# Patient Record
Sex: FEMALE | Race: WHITE | NOT HISPANIC OR LATINO | Employment: OTHER | URBAN - METROPOLITAN AREA
[De-identification: names, ages, dates, MRNs, and addresses within clinical notes are randomized per-mention and may not be internally consistent; named-entity substitution may affect disease eponyms.]

---

## 2018-04-04 ENCOUNTER — HOSPITAL ENCOUNTER (OUTPATIENT)
Dept: RADIOLOGY | Facility: HOSPITAL | Age: 63
Discharge: HOME | End: 2018-04-04
Attending: NURSE PRACTITIONER
Payer: MEDICARE

## 2018-04-04 DIAGNOSIS — M54.50 LUMBAGO: ICD-10-CM

## 2018-04-04 DIAGNOSIS — M25.551 RIGHT HIP PAIN: Primary | ICD-10-CM

## 2018-04-04 DIAGNOSIS — M25.551 RIGHT HIP PAIN: ICD-10-CM

## 2018-04-04 PROCEDURE — 73502 X-RAY EXAM HIP UNI 2-3 VIEWS: CPT | Mod: 26,RT,, | Performed by: RADIOLOGY

## 2018-04-04 PROCEDURE — 73502 X-RAY EXAM HIP UNI 2-3 VIEWS: CPT | Mod: TC,FY,RT

## 2018-04-04 PROCEDURE — 72110 X-RAY EXAM L-2 SPINE 4/>VWS: CPT | Mod: 26,,, | Performed by: RADIOLOGY

## 2018-04-04 PROCEDURE — 72110 X-RAY EXAM L-2 SPINE 4/>VWS: CPT | Mod: TC,FY

## 2018-06-04 DIAGNOSIS — Z12.31 ENCOUNTER FOR SCREENING MAMMOGRAM FOR MALIGNANT NEOPLASM OF BREAST: Primary | ICD-10-CM

## 2018-06-19 DIAGNOSIS — Z78.0 POSTMENOPAUSAL STATUS: Primary | ICD-10-CM

## 2018-06-19 DIAGNOSIS — Z13.820 SCREENING FOR OSTEOPOROSIS: ICD-10-CM

## 2018-06-27 ENCOUNTER — HOSPITAL ENCOUNTER (OUTPATIENT)
Dept: RADIOLOGY | Facility: HOSPITAL | Age: 63
Discharge: HOME | End: 2018-06-27
Attending: OBSTETRICS & GYNECOLOGY
Payer: MEDICARE

## 2018-06-27 VITALS — HEIGHT: 63 IN | WEIGHT: 180 LBS | BODY MASS INDEX: 31.89 KG/M2

## 2018-06-27 DIAGNOSIS — Z12.31 ENCOUNTER FOR SCREENING MAMMOGRAM FOR MALIGNANT NEOPLASM OF BREAST: ICD-10-CM

## 2018-06-27 DIAGNOSIS — Z13.820 SCREENING FOR OSTEOPOROSIS: ICD-10-CM

## 2018-06-27 DIAGNOSIS — Z78.0 POSTMENOPAUSAL STATUS: ICD-10-CM

## 2018-06-27 PROCEDURE — 77080 DXA BONE DENSITY AXIAL: CPT | Mod: 26,,, | Performed by: RADIOLOGY

## 2018-06-27 PROCEDURE — 77067 SCR MAMMO BI INCL CAD: CPT | Mod: TC

## 2018-06-27 PROCEDURE — 77080 DXA BONE DENSITY AXIAL: CPT | Mod: TC

## 2018-06-27 PROCEDURE — 77067 SCR MAMMO BI INCL CAD: CPT | Mod: 26,,, | Performed by: RADIOLOGY

## 2019-01-07 DIAGNOSIS — Z12.39 SCREENING BREAST EXAMINATION: Primary | ICD-10-CM

## 2019-01-07 DIAGNOSIS — J20.9 ACUTE BRONCHITIS: ICD-10-CM

## 2019-04-29 ENCOUNTER — HOSPITAL ENCOUNTER (EMERGENCY)
Facility: HOSPITAL | Age: 64
Discharge: HOME | End: 2019-04-29
Attending: EMERGENCY MEDICINE
Payer: MEDICARE

## 2019-04-29 VITALS
HEIGHT: 63 IN | OXYGEN SATURATION: 97 % | BODY MASS INDEX: 31.89 KG/M2 | TEMPERATURE: 98 F | HEART RATE: 74 BPM | DIASTOLIC BLOOD PRESSURE: 84 MMHG | SYSTOLIC BLOOD PRESSURE: 154 MMHG | RESPIRATION RATE: 16 BRPM | WEIGHT: 180 LBS

## 2019-04-29 DIAGNOSIS — R06.02 SOB (SHORTNESS OF BREATH): ICD-10-CM

## 2019-04-29 DIAGNOSIS — R20.0 NUMBNESS: ICD-10-CM

## 2019-04-29 DIAGNOSIS — R20.2 PINS AND NEEDLES SENSATION: Primary | ICD-10-CM

## 2019-04-29 LAB
ALBUMIN SERPL BCP-MCNC: 4.2 G/DL (ref 3.5–5.2)
ALP SERPL-CCNC: 86 U/L (ref 55–135)
ALT SERPL W/O P-5'-P-CCNC: 34 U/L (ref 10–44)
ANION GAP SERPL CALC-SCNC: 10 MMOL/L (ref 8–16)
AST SERPL-CCNC: 36 U/L (ref 10–40)
BASOPHILS # BLD AUTO: 0.09 K/UL (ref 0–0.2)
BASOPHILS NFR BLD: 1.1 % (ref 0–1.9)
BILIRUB SERPL-MCNC: 0.6 MG/DL (ref 0.1–1)
BILIRUB UR QL STRIP: NEGATIVE
BUN SERPL-MCNC: 5 MG/DL (ref 8–23)
CALCIUM SERPL-MCNC: 9.7 MG/DL (ref 8.7–10.5)
CHLORIDE SERPL-SCNC: 105 MMOL/L (ref 95–110)
CLARITY UR: CLEAR
CO2 SERPL-SCNC: 26 MMOL/L (ref 23–29)
COLOR UR: YELLOW
CREAT SERPL-MCNC: 0.8 MG/DL (ref 0.5–1.4)
DIFFERENTIAL METHOD: NORMAL
EOSINOPHIL # BLD AUTO: 0.4 K/UL (ref 0–0.5)
EOSINOPHIL NFR BLD: 4.9 % (ref 0–8)
ERYTHROCYTE [DISTWIDTH] IN BLOOD BY AUTOMATED COUNT: 13 % (ref 11.5–14.5)
EST. GFR  (AFRICAN AMERICAN): >60 ML/MIN/1.73 M^2
EST. GFR  (NON AFRICAN AMERICAN): >60 ML/MIN/1.73 M^2
GLUCOSE SERPL-MCNC: 95 MG/DL (ref 70–110)
GLUCOSE UR QL STRIP: NEGATIVE
HCT VFR BLD AUTO: 42.3 % (ref 37–48.5)
HGB BLD-MCNC: 13.8 G/DL (ref 12–16)
HGB UR QL STRIP: NEGATIVE
IMM GRANULOCYTES # BLD AUTO: 0.02 K/UL (ref 0–0.04)
IMM GRANULOCYTES NFR BLD AUTO: 0.2 % (ref 0–0.5)
KETONES UR QL STRIP: NEGATIVE
LEUKOCYTE ESTERASE UR QL STRIP: NEGATIVE
LYMPHOCYTES # BLD AUTO: 3.1 K/UL (ref 1–4.8)
LYMPHOCYTES NFR BLD: 37.2 % (ref 18–48)
MCH RBC QN AUTO: 29.9 PG (ref 27–31)
MCHC RBC AUTO-ENTMCNC: 32.6 G/DL (ref 32–36)
MCV RBC AUTO: 92 FL (ref 82–98)
MONOCYTES # BLD AUTO: 0.6 K/UL (ref 0.3–1)
MONOCYTES NFR BLD: 7.6 % (ref 4–15)
NEUTROPHILS # BLD AUTO: 4.1 K/UL (ref 1.8–7.7)
NEUTROPHILS NFR BLD: 49 % (ref 38–73)
NITRITE UR QL STRIP: NEGATIVE
NRBC BLD-RTO: 0 /100 WBC
PH UR STRIP: 7 [PH] (ref 5–8)
PLATELET # BLD AUTO: 291 K/UL (ref 150–350)
PMV BLD AUTO: 10 FL (ref 9.2–12.9)
POTASSIUM SERPL-SCNC: 4 MMOL/L (ref 3.5–5.1)
PROT SERPL-MCNC: 7.5 G/DL (ref 6–8.4)
PROT UR QL STRIP: NEGATIVE
RBC # BLD AUTO: 4.61 M/UL (ref 4–5.4)
SODIUM SERPL-SCNC: 141 MMOL/L (ref 136–145)
SP GR UR STRIP: 1.01 (ref 1–1.03)
TROPONIN I SERPL DL<=0.01 NG/ML-MCNC: <0.01 NG/ML (ref 0.02–0.5)
URN SPEC COLLECT METH UR: NORMAL
UROBILINOGEN UR STRIP-ACNC: NEGATIVE EU/DL
WBC # BLD AUTO: 8.31 K/UL (ref 3.9–12.7)

## 2019-04-29 PROCEDURE — 81003 URINALYSIS AUTO W/O SCOPE: CPT

## 2019-04-29 PROCEDURE — 36415 COLL VENOUS BLD VENIPUNCTURE: CPT

## 2019-04-29 PROCEDURE — 71046 XR CHEST PA AND LATERAL: ICD-10-PCS | Mod: 26,,, | Performed by: RADIOLOGY

## 2019-04-29 PROCEDURE — 85025 COMPLETE CBC W/AUTO DIFF WBC: CPT

## 2019-04-29 PROCEDURE — 70450 CT HEAD/BRAIN W/O DYE: CPT | Mod: 26,,, | Performed by: RADIOLOGY

## 2019-04-29 PROCEDURE — 80053 COMPREHEN METABOLIC PANEL: CPT

## 2019-04-29 PROCEDURE — 71046 X-RAY EXAM CHEST 2 VIEWS: CPT | Mod: TC,FY

## 2019-04-29 PROCEDURE — 71046 X-RAY EXAM CHEST 2 VIEWS: CPT | Mod: 26,,, | Performed by: RADIOLOGY

## 2019-04-29 PROCEDURE — 99285 EMERGENCY DEPT VISIT HI MDM: CPT | Mod: 25

## 2019-04-29 PROCEDURE — 84484 ASSAY OF TROPONIN QUANT: CPT

## 2019-04-29 PROCEDURE — 93005 ELECTROCARDIOGRAM TRACING: CPT

## 2019-04-29 PROCEDURE — 70450 CT HEAD/BRAIN W/O DYE: CPT | Mod: TC

## 2019-04-29 PROCEDURE — 70450 CT HEAD WITHOUT CONTRAST: ICD-10-PCS | Mod: 26,,, | Performed by: RADIOLOGY

## 2019-04-29 RX ORDER — LEVOTHYROXINE SODIUM 75 UG/1
TABLET ORAL
COMMUNITY
End: 2020-11-13

## 2019-04-29 RX ORDER — FLUTICASONE PROPIONATE AND SALMETEROL 250; 50 UG/1; UG/1
1 POWDER RESPIRATORY (INHALATION)
COMMUNITY

## 2019-04-29 RX ORDER — DIAZEPAM 5 MG/1
TABLET ORAL
COMMUNITY
End: 2022-08-24 | Stop reason: DRUGHIGH

## 2019-04-29 RX ORDER — DICYCLOMINE HYDROCHLORIDE 20 MG/1
TABLET ORAL
COMMUNITY

## 2019-04-29 RX ORDER — ALBUTEROL SULFATE 90 UG/1
AEROSOL, METERED RESPIRATORY (INHALATION)
COMMUNITY
Start: 2018-04-04

## 2019-04-29 RX ORDER — ESOMEPRAZOLE MAGNESIUM 40 MG/1
CAPSULE, DELAYED RELEASE ORAL
COMMUNITY
End: 2022-08-24 | Stop reason: ALTCHOICE

## 2019-04-29 NOTE — ED PROVIDER NOTES
"Encounter Date: 4/29/2019       History     Chief Complaint   Patient presents with    Numbness     neck problems      Hilda Cheek is a  63 y.o female with PMHx including arthritis, COPD, hypertension and thyroid disease. She presents to ED with complaint of "tingling- sensation to left face and left upper extremity x 3 days.  No "tingling-sensation" to left leg  No facial asymetry, slurred speech or arm drift  Hand  are equal  No neurological deficits noted.  Patient is ambulatory with normal gait  She reports chronic neck pain with no recent injury or trauma            Review of patient's allergies indicates:  No Known Allergies  Past Medical History:   Diagnosis Date    Arthritis     COPD (chronic obstructive pulmonary disease)     Hypertension     Thyroid disease      Past Surgical History:   Procedure Laterality Date    OOPHORECTOMY Left     age 21     Family History   Problem Relation Age of Onset    Breast cancer Neg Hx      Social History     Tobacco Use    Smoking status: Not on file   Substance Use Topics    Alcohol use: Not on file    Drug use: Not on file     Review of Systems   Constitutional: Negative.  Negative for fever.   HENT: Negative.  Negative for sore throat.    Eyes: Negative.    Respiratory: Negative.  Negative for shortness of breath.    Cardiovascular: Negative.  Negative for chest pain.   Gastrointestinal: Negative.  Negative for nausea.   Endocrine: Negative.    Genitourinary: Negative.  Negative for dysuria.   Musculoskeletal: Negative.  Negative for back pain.   Skin: Negative for rash.   Allergic/Immunologic: Negative.    Neurological: Positive for numbness ("tingling-sensation"). Negative for dizziness, tremors, seizures, syncope, facial asymmetry, speech difficulty, weakness, light-headedness and headaches.   Hematological: Does not bruise/bleed easily.   Psychiatric/Behavioral: Negative.    All other systems reviewed and are negative.      Physical Exam     Initial " "Vitals [04/29/19 1443]   BP Pulse Resp Temp SpO2   138/84 70 18 98.6 °F (37 °C) 97 %      MAP       --         Physical Exam    Nursing note and vitals reviewed.  Constitutional: She appears well-developed and well-nourished.   HENT:   Head: Normocephalic.   Eyes: Conjunctivae are normal. Pupils are equal, round, and reactive to light.   Neck: Normal range of motion.   Cardiovascular: Normal rate, regular rhythm and normal heart sounds.   Pulmonary/Chest: Breath sounds normal.   Musculoskeletal: Normal range of motion.        Cervical back: She exhibits tenderness, pain and spasm. She exhibits normal range of motion, no bony tenderness, no swelling, no edema, no deformity, no laceration and normal pulse.   Neurological: She is alert and oriented to person, place, and time. She has normal strength. No sensory deficit. Coordination and gait normal. GCS eye subscore is 4. GCS verbal subscore is 5. GCS motor subscore is 6.   Patient can feel touch to face and arm.   She states "I can feel"    She complaints of pins and needles sensation to left cheek, arm and hand   Skin: Skin is warm and dry.   Psychiatric: She has a normal mood and affect. Her behavior is normal. Judgment and thought content normal.         ED Course   Procedures  Labs Reviewed   COMPREHENSIVE METABOLIC PANEL - Abnormal; Notable for the following components:       Result Value    BUN, Bld 5 (*)     All other components within normal limits   TROPONIN I - Abnormal; Notable for the following components:    Troponin I <0.01 (*)     All other components within normal limits   CBC W/ AUTO DIFFERENTIAL   URINALYSIS, REFLEX TO URINE CULTURE    Narrative:     Preferred Collection Type->Urine, Clean Catch          Imaging Results          X-Ray Chest PA And Lateral (Final result)  Result time 04/29/19 16:01:18    Final result by Isaias Guzmán MD (04/29/19 16:01:18)                 Impression:      No acute abnormality.      Electronically signed " by: Isaias Guzmán  Date:    04/29/2019  Time:    16:01             Narrative:    EXAMINATION:  XR CHEST PA AND LATERAL    CLINICAL HISTORY:  Shortness of breath    TECHNIQUE:  PA and lateral views of the chest were performed.    COMPARISON:  Chest x-ray 07/08/2011.    FINDINGS:  The lungs are clear, with normal appearance of pulmonary vasculature and no pleural effusion or pneumothorax.    The cardiac silhouette is normal in size. The hilar and mediastinal contours are unremarkable.    Bones are intact.                               CT Head Without Contrast (Final result)  Result time 04/29/19 15:52:49    Final result by Isaias Guzmán MD (04/29/19 15:52:49)                 Impression:      1. Mild cortical atrophy with periventricular deep white matter change consistent with early small vessel ischemic disease.  2. Dependent mucous retention cyst of the left maxillary sinus.      Electronically signed by: Isaias Guzmán  Date:    04/29/2019  Time:    15:52             Narrative:    EXAMINATION:  CT HEAD WITHOUT CONTRAST    CLINICAL HISTORY:  Focal neuro deficit, new, fixed or worsening, >6 hours;    TECHNIQUE:  Low dose axial images were obtained through the head.  Coronal and sagittal reformations were also performed. Contrast was not administered.    COMPARISON:  Head CT 10/12/2016.    FINDINGS:  There is no acute hemorrhage or infarction.  There is mild cortical atrophy.  There are periventricular deep white matter changes consistent with early small vessel ischemic disease.    No extra-axial fluid collections.  Ventricles are normal in size, shape and configuration.  The basal cisterns are patent.    Partial visualization of a dependent mucous retention cyst of the left maxillary sinus.  The remaining imaged paranasal sinuses and ethmoid air cells are well aerated.    The mastoid air cells and middle ears are normally pneumatized.                                 Medical Decision Making:   Initial  "Assessment:   Patient with complaint of "tingling- sensation to left face and left upper extremity x 3 days.  No "tingling-sensation" to left leg  No facial asymetry, slurred speech or arm drift  Hand  are equal  No neurological deficits noted.  Patient is ambulatory with normal gait        Differential Diagnosis:   Stroke, intracranial etiology  parathesia  Clinical Tests:   Lab Tests: Ordered  Radiological Study: Ordered  Medical Tests: Ordered  ED Management:  Labs, CT head and CXR reviewed by Dr. Rose. He recommended that she follow-up with PCP and add baby aspirin daily to regimen    Discussed physical exam findings with patient  No acute emergent medical condition identified at this time to warrant further testing/diagnostics.  Plan to discharge patient home with instructions per AVS.  Follow-up with PCP in 2-5 days or return to ED if symptoms worsen.  Patient verbalized agreement to discharge treatment plan.        Other:   I discussed test(s) with the performing physician.                   ED Course as of Apr 29 1802   Mon Apr 29, 2019   1759 Dr. Rose reviewed labs, CT head and chest XR    [JL]      ED Course User Index  [JL] Renata Flannery NP     Clinical Impression:       ICD-10-CM ICD-9-CM   1. Pins and needles sensation R20.2 782.0   2. Numbness R20.0 782.0   3. SOB (shortness of breath) R06.02 786.05                                Renata Flannery NP  04/29/19 2134    "

## 2019-05-02 DIAGNOSIS — Z12.39 SCREENING BREAST EXAMINATION: Primary | ICD-10-CM

## 2019-07-01 ENCOUNTER — HOSPITAL ENCOUNTER (OUTPATIENT)
Dept: RADIOLOGY | Facility: HOSPITAL | Age: 64
Discharge: HOME | End: 2019-07-01
Attending: NURSE PRACTITIONER
Payer: MEDICARE

## 2019-07-01 VITALS — WEIGHT: 180 LBS | BODY MASS INDEX: 31.89 KG/M2 | HEIGHT: 63 IN

## 2019-07-01 DIAGNOSIS — Z12.39 SCREENING BREAST EXAMINATION: ICD-10-CM

## 2019-07-01 PROCEDURE — 77063 MAMMO DIGITAL SCREENING BILAT WITH TOMOSYNTHESIS_CAD: ICD-10-PCS | Mod: 26,,, | Performed by: RADIOLOGY

## 2019-07-01 PROCEDURE — 77067 SCR MAMMO BI INCL CAD: CPT | Mod: 26,,, | Performed by: RADIOLOGY

## 2019-07-01 PROCEDURE — 77063 BREAST TOMOSYNTHESIS BI: CPT | Mod: 26,,, | Performed by: RADIOLOGY

## 2019-07-01 PROCEDURE — 77067 SCR MAMMO BI INCL CAD: CPT | Mod: TC

## 2019-07-01 PROCEDURE — 77067 MAMMO DIGITAL SCREENING BILAT WITH TOMOSYNTHESIS_CAD: ICD-10-PCS | Mod: 26,,, | Performed by: RADIOLOGY

## 2019-08-19 DIAGNOSIS — Z12.39 SCREENING BREAST EXAMINATION: Primary | ICD-10-CM

## 2019-08-19 DIAGNOSIS — Z13.820 SCREENING FOR OSTEOPOROSIS: ICD-10-CM

## 2019-08-19 DIAGNOSIS — Z78.0 POSTMENOPAUSAL STATUS: ICD-10-CM

## 2020-01-03 ENCOUNTER — CLINICAL SUPPORT (OUTPATIENT)
Dept: REHABILITATION | Facility: HOSPITAL | Age: 65
End: 2020-01-03
Payer: MEDICARE

## 2020-01-03 DIAGNOSIS — M62.81 MUSCLE WEAKNESS: ICD-10-CM

## 2020-01-03 DIAGNOSIS — M54.10 RADICULOPATHY AFFECTING UPPER EXTREMITY: ICD-10-CM

## 2020-01-03 PROCEDURE — 97162 PT EVAL MOD COMPLEX 30 MIN: CPT
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
## 2020-01-03 NOTE — PROGRESS NOTES
See Initial Evaluation in the POC section            PT met face to face with Kartik Bills PTA to discuss patient's treatment plan and progress towards established goals.  Treatment will be continued as described in initial report/eval and progress notes.  Patient will be seen by physical therapist every sixth visit and minimally once per month.    Additional information: N/A

## 2020-01-07 PROBLEM — M54.10 RADICULOPATHY AFFECTING UPPER EXTREMITY: Status: ACTIVE | Noted: 2020-01-07

## 2020-01-07 PROBLEM — M62.81 MUSCLE WEAKNESS: Status: ACTIVE | Noted: 2020-01-07

## 2020-01-22 ENCOUNTER — CLINICAL SUPPORT (OUTPATIENT)
Dept: REHABILITATION | Facility: HOSPITAL | Age: 65
End: 2020-01-22
Payer: MEDICARE

## 2020-01-22 DIAGNOSIS — M54.10 RADICULOPATHY AFFECTING UPPER EXTREMITY: ICD-10-CM

## 2020-01-22 DIAGNOSIS — M62.81 MUSCLE WEAKNESS: ICD-10-CM

## 2020-01-22 PROCEDURE — 97110 THERAPEUTIC EXERCISES: CPT

## 2020-01-22 PROCEDURE — 97140 MANUAL THERAPY 1/> REGIONS: CPT

## 2020-01-22 NOTE — PROGRESS NOTES
Physical Therapy Daily Treatment Note   Name: Hilda Cheek 1955  MRN: [id number]    Visit Date: 1/22/2020  Visit #: 2 / 12  Authorization period Expiration: 12/31/20    Plan of Care Expiration: 3/27/20  Precautions: standard    Time In: 10:09  Time Out: 11:00  Total 1:1 Treatment Time: 32 min    Treatment Diagnosis:   Encounter Diagnoses   Name Primary?    Radiculopathy affecting upper extremity     Muscle weakness      Physician: Maria Elena Whittington DO    Subjective   Pt reports: no change since last seen, no pain reported just N/T in LUE described as aggravating but not as bad as it was     Pain Scale:  0/10 on VAS currently,  Pain Location: left UE    Objective     HILDA received moist heat to cervical spine prior to manual therapy for 10 min after being cleared for contradictions with no adverse effects noted.    Hilda received therapeutic exercises to develop strength, ROM, flexibility and posture for 10 minutes including:    Shoulder blade squeezes with 5 sec hold x 10  Chin tucks with 5 sec hold x 10  Corner stretch with 5 sec hold x 10    HILDA received the following manual therapy techniques: Joint mobilizations AP, rot, and transverse glides, Manual traction, PROM, and Soft tissue Mobilization were applied to the: Csp and Tsp for 22 minutes.     Home Exercises and Education Provided     Education provided re:   - progress towards goals   - role of therapy in multi - disciplinary team, goals for therapy  Pt educated on condition, POC, and expectations in therapy.  No spiritual or educational barriers to learning provided    Home exercises: shld blade squeezes w 5 sec hold x 10, chin tucks w 5 sec hold x 10, corner stretch  Pt will be provided HEP during course of treatment with progressions as appropriate. Pt was advised to perform these exercises free of pain, and to stop performing them if pain occurs.   HILDA demonstrated good  understanding of the education  provided.     Assessment   MILDRED tolerated treatment without complication or increase pain or radicular symptoms reported.     Pt prognosis is Fair. Pt will continue to benefit from skilled outpatient physical therapy to address the deficits listed in the problem list chart on initial evaluation, provide pt/family education and to maximize pt's level of independence in the home and community environment.     Medical necessity is demonstrated by the impairments and functional limitations listed on the Initial Evaluation.     Anticipated barriers to physical therapy: chronicity of symptoms  Pt's spiritual, cultural and educational needs considered and pt agreeable to plan of care and goals.    Plan   Continue with established Plan of Care towards Physical Therapy goals.   Discussed Plan of Care with patient: Yes    Dave Navarro, PT  1/22/2020

## 2020-01-24 ENCOUNTER — CLINICAL SUPPORT (OUTPATIENT)
Dept: REHABILITATION | Facility: HOSPITAL | Age: 65
End: 2020-01-24
Payer: MEDICARE

## 2020-01-24 DIAGNOSIS — M54.10 RADICULOPATHY AFFECTING UPPER EXTREMITY: ICD-10-CM

## 2020-01-24 DIAGNOSIS — M62.81 MUSCLE WEAKNESS: ICD-10-CM

## 2020-01-24 PROCEDURE — 97110 THERAPEUTIC EXERCISES: CPT

## 2020-01-24 PROCEDURE — 97140 MANUAL THERAPY 1/> REGIONS: CPT

## 2020-01-24 NOTE — PROGRESS NOTES
Physical Therapy Daily Treatment Note   Name: Hilda Cheek 1955  MRN: [id number]    Visit Date: 1/24/2020  Visit #: 3 / 12  Authorization period Expiration: 12/31/20    Plan of Care Expiration: 3/27/20  Precautions: standard    Time In: 9:12 am  Time Out: 10:00  Total 1:1 Treatment Time: 30 min    Treatment Diagnosis:   Encounter Diagnoses   Name Primary?    Radiculopathy affecting upper extremity     Muscle weakness      Physician: Maria Elena Whittington DO    Subjective   Pt reports: no c/o neck pain and no change in LUE N/T     Pain Scale:  0/10 on VAS currently,  Pain Location: neck, left UE    Objective     HILDA received moist heat to cervical spine prior to manual therapy for 10 min after being cleared for contradictions with no adverse effects noted.    Hilda received therapeutic exercises to develop strength, ROM, flexibility and posture for 10 minutes including:    Shoulder blade squeezes with 5 sec hold x 10  Chin tucks with 5 sec hold x 10  Corner stretch with 5 sec hold x 10  B ER with YTB with 3 sec hold x 10    HILDA received the following manual therapy techniques: Joint mobilizations AP, rot, and transverse glides, Manual traction, PROM, and Soft tissue Mobilization were applied to the: Csp and Tsp for 20 minutes.     Home Exercises and Education Provided     Education provided re: postural care/correction and body mechanics  - progress towards goals   - role of therapy in multi - disciplinary team, goals for therapy  Pt educated on condition, POC, and expectations in therapy.  No spiritual or educational barriers to learning provided    Home exercises:  Reviewed current HEP consisting of shld blade squeezes w 5 sec hold x 10, chin tucks w 5 sec hold x 10, corner stretch, and added B ER with YTB for posture correct  Pt will be provided HEP during course of treatment with progressions as appropriate. Pt was advised to perform these exercises free of pain, and  to stop performing them if pain occurs.   MILDRED demonstrated good  understanding of the education provided.     Assessment   MILDRED tolerated treatment without complication or increase pain or radicular symptoms reported. She does report increased ttp with AP mob to T6-T8 spinous processes which she does not describe as pain just tender. Bilateral N/T in hands likely due to CTS as there is no change in radicular symptoms with treatment. Will progress with strengthening exercises to neck and upper trunk as tolerated.    Pt prognosis is Fair. Pt will continue to benefit from skilled outpatient physical therapy to address the deficits listed in the problem list chart on initial evaluation, provide pt/family education and to maximize pt's level of independence in the home and community environment.     Medical necessity is demonstrated by the impairments and functional limitations listed on the Initial Evaluation.     Anticipated barriers to physical therapy: chronicity of symptoms  Pt's spiritual, cultural and educational needs considered and pt agreeable to plan of care and goals.    Plan   Continue with established Plan of Care towards Physical Therapy goals.   Discussed Plan of Care with patient: Yes    Dave Navarro, PT  1/24/2020

## 2020-01-31 ENCOUNTER — CLINICAL SUPPORT (OUTPATIENT)
Dept: REHABILITATION | Facility: HOSPITAL | Age: 65
End: 2020-01-31
Payer: MEDICARE

## 2020-01-31 DIAGNOSIS — M54.10 RADICULOPATHY AFFECTING UPPER EXTREMITY: ICD-10-CM

## 2020-01-31 DIAGNOSIS — M62.81 MUSCLE WEAKNESS: ICD-10-CM

## 2020-01-31 PROCEDURE — 97140 MANUAL THERAPY 1/> REGIONS: CPT

## 2020-01-31 PROCEDURE — 97110 THERAPEUTIC EXERCISES: CPT

## 2020-01-31 NOTE — PROGRESS NOTES
"                            Physical Therapy Daily Treatment Note   Name: Hilda Cheek 1955  MRN: [id number]    Visit Date: 2020  Visit #:   Authorization period Expiration: 20    Plan of Care Expiration: 3/27/20  Precautions: standard    Time In: 10:00 am  Time Out: 11:00 am  Total 1:1 Treatment Time: 38 min    Treatment Diagnosis:   Encounter Diagnoses   Name Primary?    Radiculopathy affecting upper extremity     Muscle weakness      Physician: Maria Elena Whittington DO    Subjective   Pt reports: No c/o neck pain and no change in LUE N/T, her hand feels like she is holding sand. She reports going to see ortho yesterday and getting an injection in her right lateral hip/thigh area because she has been having intermittent numbness there. She is also having intermittent "pain" in her left leg and continues to have numbness around her mouth and nose on left side. She  does not feel like she is suffering or she is limited, her symptoms just annoy her. Hilda stating these symptoms started when her brother  last year.     Pain Scale:  0/10 on VAS currently,  Pain Location: neck, left UE    Objective     HILDA received moist heat to cervical spine prior to manual therapy for 10 min after being cleared for contradictions with no adverse effects noted.    Hilda received therapeutic exercises to develop strength, ROM, flexibility and posture for 23 minutes including:    Shoulder blade squeezes with 5 sec hold x 10  Chin tucks with 5 sec hold x 10  Corner stretch with 5 sec hold x 10  B ER with YTB with 3 sec hold x 10  Seated knee ext with 5 sec hold x 10 ea  B rows with YTB x 10  B shoulder ext with YTB x 10  B H abd at 90 degrees with YTB x 10  Nustep Lv0 x 10 min    HILDA received the following manual therapy techniques: Joint mobilizations AP, rot, and transverse glides, Manual traction, PROM, and Soft tissue Mobilization were applied to the: Csp and Tsp for 15 minutes.     Special " tests:  UE neurodynamic testing neg  Cervical compression/distraction neg  Facet loading neg on left, mild pain reported on right  Spurlings neg on left, mild pain reported on right    Home Exercises and Education Provided     Education provided re: postural care/correction and body mechanics  - progress towards goals   - role of therapy in multi - disciplinary team, goals for therapy  Pt educated on condition, POC, and expectations in therapy.  No spiritual or educational barriers to learning provided    Home exercises:  Reviewed current HEP consisting of shld blade squeezes w 5 sec hold x 10, chin tucks w 5 sec hold x 10, corner stretch, B ER with YTB for posture correct  Pt will be provided HEP during course of treatment with progressions as appropriate. Pt was advised to perform these exercises free of pain, and to stop performing them if pain occurs.   MILDRED demonstrated good  understanding of the education provided.     Assessment   MILDRED tolerated treatment well progressing with exercise as noted above without complication or increase pain or radicular symptoms reported. Neurodynamic UE testing continues to be negative for symptom reproduction. Will progress with strengthening exercises to neck and upper trunk as tolerated.    Pt prognosis is Fair. Pt will continue to benefit from skilled outpatient physical therapy to address the deficits listed in the problem list chart on initial evaluation, provide pt/family education and to maximize pt's level of independence in the home and community environment.     Medical necessity is demonstrated by the impairments and functional limitations listed on the Initial Evaluation.     Anticipated barriers to physical therapy: chronicity of symptoms  Pt's spiritual, cultural and educational needs considered and pt agreeable to plan of care and goals.    Plan   Continue with established Plan of Care towards Physical Therapy goals.   Discussed Plan of Care with  patient: Yes    Dave Navarro, PT  1/31/2020

## 2020-02-07 ENCOUNTER — CLINICAL SUPPORT (OUTPATIENT)
Dept: REHABILITATION | Facility: HOSPITAL | Age: 65
End: 2020-02-07
Payer: MEDICARE

## 2020-02-07 DIAGNOSIS — M54.10 RADICULOPATHY AFFECTING UPPER EXTREMITY: ICD-10-CM

## 2020-02-07 DIAGNOSIS — M62.81 MUSCLE WEAKNESS: ICD-10-CM

## 2020-02-07 PROCEDURE — 97110 THERAPEUTIC EXERCISES: CPT

## 2020-02-07 PROCEDURE — 97140 MANUAL THERAPY 1/> REGIONS: CPT

## 2020-03-02 ENCOUNTER — CLINICAL SUPPORT (OUTPATIENT)
Dept: REHABILITATION | Facility: HOSPITAL | Age: 65
End: 2020-03-02
Payer: MEDICARE

## 2020-03-02 DIAGNOSIS — M62.81 MUSCLE WEAKNESS: ICD-10-CM

## 2020-03-02 DIAGNOSIS — M54.10 RADICULOPATHY AFFECTING UPPER EXTREMITY: ICD-10-CM

## 2020-03-02 PROCEDURE — 97750 PHYSICAL PERFORMANCE TEST: CPT

## 2020-03-02 PROCEDURE — 97110 THERAPEUTIC EXERCISES: CPT

## 2020-03-02 NOTE — PROGRESS NOTES
Physical Therapy Daily Treatment Note / Discharge Note   Name: Hilda Cheek 1955  MRN: [id number]    Visit Date: 3/2/2020  Visit #: 6   Authorization period Expiration: 12/31/20    Plan of Care Expiration: 3/27/20  Precautions: standard    Time In: 14:05 pm  Time Out: 14:55 pm  Total 1:1 Treatment Time: 38 min    Treatment Diagnosis:   Encounter Diagnoses   Name Primary?    Radiculopathy affecting upper extremity     Muscle weakness      Physician: Maria Elena Whittington DO    Subjective   Pt reports: No c/o neck pain and no change in LUE N/T, overall feels stronger and feels ready for discharge    Pain Scale:  0/10 on VAS currently  Pain Location: left UE    Objective     Hilda received therapeutic exercises to develop strength, ROM, flexibility and posture for 25 minutes including:    UBE with 2 bars 6 min  B rows with RTB x 10  B shoulder ext with RTB x 10  B H abd at 90 degrees with RTB x 10  Nustep Lv1 x 10 min  B ER with RTB with 3 sec hold x 10   Seated knee ext with 5 sec hold x 10 ea DNP  Reviewed HEP    HILDA received the following manual therapy techniques: Joint mobilizations AP, rot, and transverse glides, Manual traction, PROM, and Soft tissue Mobilization were applied to the: Csp and Tsp for 15 minutes. DNP  Moist heat not performed today.    Physical performance testing x 10 minutes with following written report. Discussed findings with patient to educate on gains made since IE and residual deficits remaining.    Cervical Range of Motion:   Degrees  IE                3/2/20 Pain   Flexion 50                    60 No increase      Extension 30                    50 End range       Right Rotation 60                    62 No increase      Left Rotation 58                    70 No increase      Right Side Bending 45                    50 End range   Left Side Bending 45                    45 End range     Upper Extremity Strength  (R) UE  IE                    3/2/20  (L) UE  IE                     3/2/20   Shoulder flexion: 5/5                       5/5 Shoulder flexion: 4+/5                      5/5   Shoulder Abduction: 5/5                       5/5 Shoulder abduction: 4+/5                      5/5   Shoulder ER 4+/5                     5/5 Shoulder ER 4+/5                      5/5   Shoulder IR 4+/5                     5/5 Shoulder IR 4+/5                      5/5   Elbow flexion: 5/5                       5/5 Elbow flexion: 5/5                        5/5   Elbow extension: 5/5                       5/5 Elbow extension: 5/5                        5/5   Wrist flexion: 4+/5                    5/5 Wrist flexion: 4+/5                     4+/5   Wrist extension: 4+/5                    5/5 Wrist extension: 4/5                       4+/5    23#                   32.5# : 15#                    18.5#   Lower Trap 4/5                      4+/5 Lower Trap 4/5                       4+/5   Middle Trap 4/5                      4+/5 Middle Trap 4/5                       4+/5   Rhomboids 4-/5                      4/5 Rhomboids 4-/5                       4/5     Home Exercises and Education Provided     Education provided re: Discharge instructions  - postural care/correction and body mechanics  - progress towards goals   - role of therapy in multi - disciplinary team, goals for therapy  Pt educated on condition, POC, and expectations in therapy.  No spiritual or educational barriers to learning provided    Home exercises:  Reviewed current HEP for D/C and issued RTB for exercise progression as well as written instructions  Pt will be provided HEP during course of treatment with progressions as appropriate. Pt was advised to perform these exercises free of pain, and to stop performing them if pain occurs.   MILDRED demonstrated good  understanding of the education provided.     Assessment   MILDRED tolerated treatment well without complication, increase pain, or change in radicular  symptoms reported. Patient has no c/o neck pain and has made progress with strength and ROM as noted above, however, continues to have LUE paresthesias not responsive to treatment interventions. Patient has met 6 out of 8 goals set at initial eval and is agreeable to discharge at this time with the understanding that through compliance with HEP she may maintain gains made in therapy.    Long Term Goals: 6 weeks  Pain: Decrease pain to 2/10 at max to allow for improved functional mobility Goal met 3/2/20  Strength: Improve strength in UE and core muscles by at least 1/2 muscle grade for improved cervical stability Goal met 3/2/20  ROM: Improve ROM to 90% of normal limits Goal met 3/2/20  Functional scale: Improve score on Neck Disability Index to 0% impairment Goal not met 3/2/20   Sensation: Decrease reported LUE parasthesias by 25% Goal not met 3/2/20  Postures: Patient to demonstrate improved postural awareness in sitting and standing with min cueing required Goal met 3/2/20  Postures: Improve static standing posture to 30 min without increase pain to increase tolerance for standing activities such as doing dishes Goal met 3/2/20  Exercise: demonstrate independence with home exercise program to maintain gains made in therapy. Goal met 3/2/20    Plan   Discharge onto [facility], follow up with referring physician as scheduled/needed.   Discharge discussed with and agreed upon with patient: Yes    Dave Navarro, PT  3/2/2020

## 2020-08-12 DIAGNOSIS — Z12.31 SCREENING MAMMOGRAM, ENCOUNTER FOR: Primary | ICD-10-CM

## 2020-10-13 ENCOUNTER — HOSPITAL ENCOUNTER (OUTPATIENT)
Dept: RADIOLOGY | Facility: HOSPITAL | Age: 65
Discharge: HOME | End: 2020-10-13
Attending: NURSE PRACTITIONER
Payer: MEDICARE

## 2020-10-13 VITALS — BODY MASS INDEX: 31.87 KG/M2 | HEIGHT: 63 IN | WEIGHT: 179.88 LBS

## 2020-10-13 DIAGNOSIS — Z12.31 SCREENING MAMMOGRAM, ENCOUNTER FOR: ICD-10-CM

## 2020-10-13 PROCEDURE — 77067 SCR MAMMO BI INCL CAD: CPT | Mod: 26,,, | Performed by: RADIOLOGY

## 2020-10-13 PROCEDURE — 77067 SCR MAMMO BI INCL CAD: CPT | Mod: TC

## 2020-10-13 PROCEDURE — 77063 BREAST TOMOSYNTHESIS BI: CPT | Mod: 26,,, | Performed by: RADIOLOGY

## 2020-10-13 PROCEDURE — 77067 MAMMO DIGITAL SCREENING BILAT WITH TOMOSYNTHESIS_CAD: ICD-10-PCS | Mod: 26,,, | Performed by: RADIOLOGY

## 2020-10-13 PROCEDURE — 77063 MAMMO DIGITAL SCREENING BILAT WITH TOMOSYNTHESIS_CAD: ICD-10-PCS | Mod: 26,,, | Performed by: RADIOLOGY

## 2020-11-12 DIAGNOSIS — Z78.0 MENOPAUSE: Primary | ICD-10-CM

## 2020-11-18 ENCOUNTER — HOSPITAL ENCOUNTER (OUTPATIENT)
Dept: RADIOLOGY | Facility: HOSPITAL | Age: 65
Discharge: HOME | End: 2020-11-18
Attending: NURSE PRACTITIONER
Payer: MEDICARE

## 2020-11-18 DIAGNOSIS — Z78.0 MENOPAUSE: ICD-10-CM

## 2020-11-18 PROCEDURE — 77080 DEXA BONE DENSITY SPINE HIP: ICD-10-PCS | Mod: 26,,, | Performed by: RADIOLOGY

## 2020-11-18 PROCEDURE — 77080 DXA BONE DENSITY AXIAL: CPT | Mod: TC

## 2020-11-18 PROCEDURE — 77080 DXA BONE DENSITY AXIAL: CPT | Mod: 26,,, | Performed by: RADIOLOGY

## 2021-08-22 ENCOUNTER — LAB VISIT (OUTPATIENT)
Dept: FAMILY MEDICINE | Facility: CLINIC | Age: 66
End: 2021-08-22
Payer: MEDICARE

## 2021-08-22 ENCOUNTER — TELEPHONE (OUTPATIENT)
Dept: GASTROENTEROLOGY | Facility: CLINIC | Age: 66
End: 2021-08-22

## 2021-08-22 DIAGNOSIS — M79.10 MUSCLE PAIN: ICD-10-CM

## 2021-08-22 DIAGNOSIS — R05.9 COUGH: ICD-10-CM

## 2021-08-22 PROCEDURE — U0005 INFEC AGEN DETEC AMPLI PROBE: HCPCS | Performed by: NURSE PRACTITIONER

## 2021-08-22 PROCEDURE — U0003 INFECTIOUS AGENT DETECTION BY NUCLEIC ACID (DNA OR RNA); SEVERE ACUTE RESPIRATORY SYNDROME CORONAVIRUS 2 (SARS-COV-2) (CORONAVIRUS DISEASE [COVID-19]), AMPLIFIED PROBE TECHNIQUE, MAKING USE OF HIGH THROUGHPUT TECHNOLOGIES AS DESCRIBED BY CMS-2020-01-R: HCPCS | Performed by: NURSE PRACTITIONER

## 2021-08-23 LAB
SARS-COV-2 RNA RESP QL NAA+PROBE: NOT DETECTED
SARS-COV-2- CYCLE NUMBER: NORMAL

## 2021-09-01 ENCOUNTER — HOSPITAL ENCOUNTER (OUTPATIENT)
Dept: RADIOLOGY | Facility: HOSPITAL | Age: 66
Discharge: HOME | End: 2021-09-01
Attending: NURSE PRACTITIONER
Payer: MEDICARE

## 2021-09-01 DIAGNOSIS — M25.561 PAIN IN RIGHT KNEE: ICD-10-CM

## 2021-09-01 PROCEDURE — 73562 X-RAY EXAM OF KNEE 3: CPT | Mod: TC,FY,RT

## 2021-09-01 PROCEDURE — 73562 X-RAY EXAM OF KNEE 3: CPT | Mod: 26,RT,, | Performed by: RADIOLOGY

## 2021-09-01 PROCEDURE — 73562 XR KNEE 3 VIEW RIGHT: ICD-10-PCS | Mod: 26,RT,, | Performed by: RADIOLOGY

## 2021-09-23 DIAGNOSIS — M25.561 RIGHT KNEE PAIN, UNSPECIFIED CHRONICITY: Primary | ICD-10-CM

## 2021-10-02 ENCOUNTER — HOSPITAL ENCOUNTER (EMERGENCY)
Facility: HOSPITAL | Age: 66
Discharge: HOME | End: 2021-10-02
Payer: MEDICARE

## 2021-10-02 VITALS
RESPIRATION RATE: 20 BRPM | HEART RATE: 64 BPM | HEIGHT: 63 IN | BODY MASS INDEX: 31.89 KG/M2 | OXYGEN SATURATION: 97 % | WEIGHT: 180 LBS | DIASTOLIC BLOOD PRESSURE: 72 MMHG | TEMPERATURE: 99 F | SYSTOLIC BLOOD PRESSURE: 140 MMHG

## 2021-10-02 DIAGNOSIS — S61.212A LACERATION OF RIGHT MIDDLE FINGER WITHOUT FOREIGN BODY WITHOUT DAMAGE TO NAIL, INITIAL ENCOUNTER: Primary | ICD-10-CM

## 2021-10-02 PROCEDURE — 99282 EMERGENCY DEPT VISIT SF MDM: CPT

## 2021-10-12 ENCOUNTER — OFFICE VISIT (OUTPATIENT)
Dept: ORTHOPEDICS | Facility: CLINIC | Age: 66
End: 2021-10-12
Payer: MEDICARE

## 2021-10-12 ENCOUNTER — HOSPITAL ENCOUNTER (OUTPATIENT)
Dept: RADIOLOGY | Facility: HOSPITAL | Age: 66
Discharge: HOME | End: 2021-10-12
Attending: ORTHOPAEDIC SURGERY
Payer: MEDICARE

## 2021-10-12 ENCOUNTER — HOSPITAL ENCOUNTER (EMERGENCY)
Facility: HOSPITAL | Age: 66
Discharge: HOME | End: 2021-10-12
Attending: EMERGENCY MEDICINE
Payer: MEDICARE

## 2021-10-12 VITALS
SYSTOLIC BLOOD PRESSURE: 151 MMHG | DIASTOLIC BLOOD PRESSURE: 129 MMHG | OXYGEN SATURATION: 96 % | WEIGHT: 185 LBS | HEIGHT: 63 IN | BODY MASS INDEX: 32.78 KG/M2 | HEART RATE: 107 BPM | RESPIRATION RATE: 18 BRPM | TEMPERATURE: 98 F

## 2021-10-12 VITALS — HEIGHT: 63 IN | BODY MASS INDEX: 31.89 KG/M2 | WEIGHT: 180 LBS | RESPIRATION RATE: 17 BRPM

## 2021-10-12 DIAGNOSIS — B85.2 LICE: Primary | ICD-10-CM

## 2021-10-12 DIAGNOSIS — M25.561 RIGHT KNEE PAIN, UNSPECIFIED CHRONICITY: Primary | ICD-10-CM

## 2021-10-12 DIAGNOSIS — G89.29 CHRONIC PAIN OF RIGHT KNEE: Primary | ICD-10-CM

## 2021-10-12 DIAGNOSIS — M25.561 RIGHT KNEE PAIN, UNSPECIFIED CHRONICITY: ICD-10-CM

## 2021-10-12 DIAGNOSIS — M25.561 CHRONIC PAIN OF RIGHT KNEE: Primary | ICD-10-CM

## 2021-10-12 PROCEDURE — 99999 PR PBB SHADOW E&M-EST. PATIENT-LVL III: ICD-10-PCS | Mod: PBBFAC,,, | Performed by: ORTHOPAEDIC SURGERY

## 2021-10-12 PROCEDURE — 73562 XR KNEE ORTHO RIGHT WITH FLEXION: ICD-10-PCS | Mod: 26,LT,, | Performed by: RADIOLOGY

## 2021-10-12 PROCEDURE — 73564 X-RAY EXAM KNEE 4 OR MORE: CPT | Mod: TC,FY,RT

## 2021-10-12 PROCEDURE — 73562 X-RAY EXAM OF KNEE 3: CPT | Mod: 26,LT,, | Performed by: RADIOLOGY

## 2021-10-12 PROCEDURE — 99999 PR PBB SHADOW E&M-EST. PATIENT-LVL III: CPT | Mod: PBBFAC,,, | Performed by: ORTHOPAEDIC SURGERY

## 2021-10-12 PROCEDURE — 99203 PR OFFICE/OUTPT VISIT, NEW, LEVL III, 30-44 MIN: ICD-10-PCS | Mod: S$PBB,25,, | Performed by: ORTHOPAEDIC SURGERY

## 2021-10-12 PROCEDURE — 99213 OFFICE O/P EST LOW 20 MIN: CPT | Mod: PBBFAC,25 | Performed by: ORTHOPAEDIC SURGERY

## 2021-10-12 PROCEDURE — 73564 X-RAY EXAM KNEE 4 OR MORE: CPT | Mod: 26,RT,, | Performed by: RADIOLOGY

## 2021-10-12 PROCEDURE — 20610 LARGE JOINT ASPIRATION/INJECTION: R KNEE: ICD-10-PCS | Mod: S$PBB,RT,, | Performed by: ORTHOPAEDIC SURGERY

## 2021-10-12 PROCEDURE — 20610 DRAIN/INJ JOINT/BURSA W/O US: CPT | Mod: PBBFAC,RT | Performed by: ORTHOPAEDIC SURGERY

## 2021-10-12 PROCEDURE — 99203 OFFICE O/P NEW LOW 30 MIN: CPT | Mod: S$PBB,25,, | Performed by: ORTHOPAEDIC SURGERY

## 2021-10-12 PROCEDURE — 99284 EMERGENCY DEPT VISIT MOD MDM: CPT | Mod: 25,27

## 2021-10-12 PROCEDURE — 73564 XR KNEE ORTHO RIGHT WITH FLEXION: ICD-10-PCS | Mod: 26,RT,, | Performed by: RADIOLOGY

## 2021-10-12 RX ORDER — TRIAMCINOLONE ACETONIDE 40 MG/ML
40 INJECTION, SUSPENSION INTRA-ARTICULAR; INTRAMUSCULAR
Status: DISCONTINUED | OUTPATIENT
Start: 2021-10-12 | End: 2021-10-12 | Stop reason: HOSPADM

## 2021-10-12 RX ORDER — HYDROXYZINE HYDROCHLORIDE 25 MG/1
50 TABLET, FILM COATED ORAL EVERY 6 HOURS
Qty: 12 TABLET | Refills: 0 | Status: SHIPPED | OUTPATIENT
Start: 2021-10-12

## 2021-10-12 RX ORDER — PERMETHRIN 50 MG/G
CREAM TOPICAL
Qty: 60 G | Refills: 0 | Status: SHIPPED | OUTPATIENT
Start: 2021-10-12 | End: 2022-11-07

## 2021-10-12 RX ADMIN — TRIAMCINOLONE ACETONIDE 40 MG: 40 INJECTION, SUSPENSION INTRA-ARTICULAR; INTRAMUSCULAR at 09:10

## 2021-10-21 ENCOUNTER — HOSPITAL ENCOUNTER (OUTPATIENT)
Dept: RADIOLOGY | Facility: HOSPITAL | Age: 66
Discharge: HOME | End: 2021-10-21
Attending: ORTHOPAEDIC SURGERY
Payer: MEDICARE

## 2021-10-21 DIAGNOSIS — M25.561 RIGHT KNEE PAIN, UNSPECIFIED CHRONICITY: ICD-10-CM

## 2021-10-21 PROCEDURE — 73721 MRI JNT OF LWR EXTRE W/O DYE: CPT | Mod: TC,RT

## 2021-10-21 PROCEDURE — 73721 MRI KNEE WITHOUT CONTRAST RIGHT: ICD-10-PCS | Mod: 26,RT,, | Performed by: RADIOLOGY

## 2021-10-21 PROCEDURE — 73721 MRI JNT OF LWR EXTRE W/O DYE: CPT | Mod: 26,RT,, | Performed by: RADIOLOGY

## 2021-10-26 ENCOUNTER — TELEPHONE (OUTPATIENT)
Dept: ORTHOPEDICS | Facility: CLINIC | Age: 66
End: 2021-10-26
Payer: MEDICAID

## 2021-11-10 ENCOUNTER — HOSPITAL ENCOUNTER (OUTPATIENT)
Dept: RADIOLOGY | Facility: HOSPITAL | Age: 66
Discharge: HOME | End: 2021-11-10
Attending: NURSE PRACTITIONER
Payer: MEDICARE

## 2021-11-10 VITALS — BODY MASS INDEX: 32.77 KG/M2 | HEIGHT: 63 IN | WEIGHT: 184.94 LBS

## 2021-11-10 DIAGNOSIS — Z12.31 ENCOUNTER FOR SCREENING MAMMOGRAM FOR MALIGNANT NEOPLASM OF BREAST: ICD-10-CM

## 2021-11-10 PROCEDURE — 77067 MAMMO DIGITAL SCREENING BILAT WITH TOMO: ICD-10-PCS | Mod: 26,,, | Performed by: RADIOLOGY

## 2021-11-10 PROCEDURE — 77067 SCR MAMMO BI INCL CAD: CPT | Mod: TC

## 2021-11-10 PROCEDURE — 77063 MAMMO DIGITAL SCREENING BILAT WITH TOMO: ICD-10-PCS | Mod: 26,,, | Performed by: RADIOLOGY

## 2021-11-10 PROCEDURE — 77063 BREAST TOMOSYNTHESIS BI: CPT | Mod: 26,,, | Performed by: RADIOLOGY

## 2021-11-10 PROCEDURE — 77067 SCR MAMMO BI INCL CAD: CPT | Mod: 26,,, | Performed by: RADIOLOGY

## 2021-12-20 ENCOUNTER — TELEPHONE (OUTPATIENT)
Dept: ORTHOPEDICS | Facility: CLINIC | Age: 66
End: 2021-12-20
Payer: MEDICAID

## 2022-01-11 ENCOUNTER — TELEPHONE (OUTPATIENT)
Dept: ORTHOPEDICS | Facility: CLINIC | Age: 67
End: 2022-01-11
Payer: MEDICAID

## 2022-01-11 NOTE — TELEPHONE ENCOUNTER
----- Message from Renetta Hoffman sent at 1/11/2022 10:28 AM CST -----  Contact: pt  Type: Needs rechedule    Who Called: pt    Best Call Back Number: [phone] or [phone]    Inquiry/Question: pt had to cancel appt for today 1/11/22. Is requesting someone call her back to reschedule.      Thank you~

## 2022-01-18 ENCOUNTER — OFFICE VISIT (OUTPATIENT)
Dept: ORTHOPEDICS | Facility: CLINIC | Age: 67
End: 2022-01-18
Payer: MEDICARE

## 2022-01-18 VITALS — HEIGHT: 63 IN | WEIGHT: 184 LBS | RESPIRATION RATE: 18 BRPM | BODY MASS INDEX: 32.6 KG/M2

## 2022-01-18 DIAGNOSIS — G89.29 CHRONIC PAIN OF RIGHT KNEE: Primary | ICD-10-CM

## 2022-01-18 DIAGNOSIS — M25.561 CHRONIC PAIN OF RIGHT KNEE: Primary | ICD-10-CM

## 2022-01-18 PROCEDURE — 99213 OFFICE O/P EST LOW 20 MIN: CPT | Mod: PBBFAC | Performed by: ORTHOPAEDIC SURGERY

## 2022-01-18 PROCEDURE — 99213 OFFICE O/P EST LOW 20 MIN: CPT | Mod: S$PBB,,, | Performed by: ORTHOPAEDIC SURGERY

## 2022-01-18 PROCEDURE — 99999 PR PBB SHADOW E&M-EST. PATIENT-LVL III: CPT | Mod: PBBFAC,,, | Performed by: ORTHOPAEDIC SURGERY

## 2022-01-18 PROCEDURE — 99999 PR PBB SHADOW E&M-EST. PATIENT-LVL III: ICD-10-PCS | Mod: PBBFAC,,, | Performed by: ORTHOPAEDIC SURGERY

## 2022-01-18 PROCEDURE — 99213 PR OFFICE/OUTPT VISIT, EST, LEVL III, 20-29 MIN: ICD-10-PCS | Mod: S$PBB,,, | Performed by: ORTHOPAEDIC SURGERY

## 2022-01-18 NOTE — PROGRESS NOTES
Past Medical History:   Diagnosis Date    Arthritis     COPD (chronic obstructive pulmonary disease)     Hypertension     Thyroid disease        Past Surgical History:   Procedure Laterality Date    OOPHORECTOMY Left     age 21       Current Outpatient Medications   Medication Sig    albuterol (PROVENTIL/VENTOLIN HFA) 90 mcg/actuation inhaler inhale 2 puff by inhalation route  every 4 - 6 hours as needed as needed    cariprazine (VRAYLAR) 1.5 mg Cap Take 1 capsule by mouth.    diazePAM (VALIUM) 5 MG tablet Take by mouth.    dicyclomine (BENTYL) 20 mg tablet dicyclomine 20 mg tablet    esomeprazole (NEXIUM) 40 MG capsule Nexium 40 mg capsule,delayed release    EUTHYROX 75 mcg tablet Take 1 tablet by mouth once daily    fluticasone-salmeterol diskus inhaler 250-50 mcg Inhale 1 puff into the lungs.    hydrOXYzine HCL (ATARAX) 25 MG tablet Take 2 tablets (50 mg total) by mouth every 6 (six) hours.    levothyroxine (EUTHYROX) 75 MCG tablet Take 1 tablet (75 mcg total) by mouth before breakfast.    permethrin (ELIMITE) 5 % cream Apply to affected area once     No current facility-administered medications for this visit.       Review of patient's allergies indicates:  No Known Allergies    Family History   Problem Relation Age of Onset    Breast cancer Neg Hx     Ovarian cancer Neg Hx        Social History     Socioeconomic History    Marital status:    Tobacco Use    Smoking status: Current Some Day Smoker    Smokeless tobacco: Never Used       Chief Complaint:   Chief Complaint   Patient presents with    Right Knee - Pain       Consulting Physician: No ref. provider found    History of present illness:    This is a 66 y.o. year old female who complains of right knee pain for over a year.  She reports at that time that she tripped over her dog and landed on her knee.  She states that her pain is a 0/10 today unless she twists.     Review of Systems:    Constitution:   Denies chills, fever, and  "sweats.  HENT:   Denies headaches or blurry vision.  Cardiovascular:  Denies chest pain or irregular heart beat.  Respiratory:   Denies cough or shortness of breath.  Gastrointestinal:  Denies abdominal pain, nausea, or vomiting.  Musculoskeletal:   Denies muscle cramps.  Neurological:   Denies dizziness or focal weakness.  Psychiatric/Behavior: Normal mental status.  Hematology/Lymph:  Denies bleeding problem or easy bruising/bleeding.  Skin:    Denies rash or suspicious lesions.    Examination:    Vital Signs:    Vitals:    01/18/22 1031   Resp: 18   Weight: 83.5 kg (184 lb)   Height: 5' 3" (1.6 m)       Body mass index is 32.59 kg/m².    Constitution:   Well-developed, well nourished patient in no acute distress.  Neurological:   Alert and oriented x 3 and cooperative to examination.     Psychiatric/Behavior: Normal mental status.  Respiratory:   No shortness of breath.  Eyes:    Extraoccular muscles intact  Skin:    No scars, rash or suspicious lesions.    Physical Exam: Right Knee Exam    Gait   Normal    Skin  Rash:   None  Scars:   None    Inspection  Erythema:  None  Bruising:  None  Effusion:  mild  Masses:  None  Lymphadenopathy: None  Calf   Soft, non-tender    Range of Motion: 0 to 130° with pain    Medial Joint : mild  Lateral Joint : yes    Patellofemoral Tenderness: None  Patellofemoral Crepitus: None    Lachman:  Normal  Anterior Drawer: Normal  Posterior Drawer: Normal    Rosey's:  positive  Apley's:  positive    Varus Stress:  Stable  Valgus Stress:  Stable    Strength:  5/5    Pulses:  Palpable distal    Sensation:  Intact          Imaging: X-rays ordered and images interpreted today personally by me of right knee shows grade 2-3 degenerative changes mostly in the medial compartment.  The MRI study images that were interpreted personally by me today and reviewed with the patient demonstrate a complex degenerative tear of the medial meniscus along with degenerative changes " of the knee.         Assessment: Chronic pain of right knee        Plan:  She has pain in her knee that she has had for over a year.  She reports it is worse with certain activities especially twisting.  We reviewed her MRI today.  She reports that she has no pain in the knee at this point.  She declined injections or therapy.  We will see her back as needed.      DISCLAIMER: This note may have been dictated using voice recognition software and may contain grammatical errors.     NOTE: Consult report sent to referring provider via [employer].

## 2022-03-21 ENCOUNTER — HOSPITAL ENCOUNTER (OUTPATIENT)
Dept: RADIOLOGY | Facility: HOSPITAL | Age: 67
Discharge: HOME | End: 2022-03-21
Attending: NURSE PRACTITIONER
Payer: MEDICARE

## 2022-03-21 DIAGNOSIS — S30.0XXS CONTUSION OF BUTTOCK, SEQUELA: ICD-10-CM

## 2022-03-21 PROCEDURE — 72110 X-RAY EXAM L-2 SPINE 4/>VWS: CPT | Mod: TC,FY

## 2022-03-21 PROCEDURE — 72220 X-RAY EXAM SACRUM TAILBONE: CPT | Mod: TC,FY

## 2022-03-21 PROCEDURE — 72220 X-RAY EXAM SACRUM TAILBONE: CPT | Mod: 26,,, | Performed by: RADIOLOGY

## 2022-03-21 PROCEDURE — 72220 XR SACRUM AND COCCYX: ICD-10-PCS | Mod: 26,,, | Performed by: RADIOLOGY

## 2022-03-21 PROCEDURE — 72110 XR LUMBAR SPINE COMPLETE 5 VIEW: ICD-10-PCS | Mod: 26,,, | Performed by: RADIOLOGY

## 2022-03-21 PROCEDURE — 72110 X-RAY EXAM L-2 SPINE 4/>VWS: CPT | Mod: 26,,, | Performed by: RADIOLOGY

## 2022-04-20 ENCOUNTER — OFFICE VISIT (OUTPATIENT)
Dept: PODIATRY | Facility: CLINIC | Age: 67
End: 2022-04-20
Payer: MEDICARE

## 2022-04-20 ENCOUNTER — HOSPITAL ENCOUNTER (OUTPATIENT)
Dept: RADIOLOGY | Facility: HOSPITAL | Age: 67
Discharge: HOME | End: 2022-04-20
Attending: PODIATRIST
Payer: MEDICARE

## 2022-04-20 VITALS
BODY MASS INDEX: 35.08 KG/M2 | SYSTOLIC BLOOD PRESSURE: 141 MMHG | WEIGHT: 198 LBS | HEIGHT: 63 IN | HEART RATE: 65 BPM | TEMPERATURE: 98 F | DIASTOLIC BLOOD PRESSURE: 91 MMHG

## 2022-04-20 DIAGNOSIS — M79.89 PAIN AND SWELLING OF TOE OF RIGHT FOOT: ICD-10-CM

## 2022-04-20 DIAGNOSIS — M79.89 PAIN AND SWELLING OF TOE OF RIGHT FOOT: Primary | ICD-10-CM

## 2022-04-20 DIAGNOSIS — L97.511 ULCER OF RIGHT FOOT, LIMITED TO BREAKDOWN OF SKIN: ICD-10-CM

## 2022-04-20 DIAGNOSIS — B35.3 TINEA PEDIS OF BOTH FEET: ICD-10-CM

## 2022-04-20 DIAGNOSIS — M79.674 PAIN AND SWELLING OF TOE OF RIGHT FOOT: ICD-10-CM

## 2022-04-20 DIAGNOSIS — M79.674 PAIN AND SWELLING OF TOE OF RIGHT FOOT: Primary | ICD-10-CM

## 2022-04-20 PROCEDURE — 73630 X-RAY EXAM OF FOOT: CPT | Mod: 26,RT,, | Performed by: RADIOLOGY

## 2022-04-20 PROCEDURE — 3080F PR MOST RECENT DIASTOLIC BLOOD PRESSURE >= 90 MM HG: ICD-10-PCS | Mod: CPTII,S$GLB,, | Performed by: PODIATRIST

## 2022-04-20 PROCEDURE — 1159F PR MEDICATION LIST DOCUMENTED IN MEDICAL RECORD: ICD-10-PCS | Mod: CPTII,S$GLB,, | Performed by: PODIATRIST

## 2022-04-20 PROCEDURE — 3077F PR MOST RECENT SYSTOLIC BLOOD PRESSURE >= 140 MM HG: ICD-10-PCS | Mod: CPTII,S$GLB,, | Performed by: PODIATRIST

## 2022-04-20 PROCEDURE — 99999 PR PBB SHADOW E&M-EST. PATIENT-LVL III: CPT | Mod: PBBFAC,,, | Performed by: PODIATRIST

## 2022-04-20 PROCEDURE — 3080F DIAST BP >= 90 MM HG: CPT | Mod: CPTII,S$GLB,, | Performed by: PODIATRIST

## 2022-04-20 PROCEDURE — 99203 OFFICE O/P NEW LOW 30 MIN: CPT | Mod: S$GLB,,, | Performed by: PODIATRIST

## 2022-04-20 PROCEDURE — 99203 PR OFFICE/OUTPT VISIT, NEW, LEVL III, 30-44 MIN: ICD-10-PCS | Mod: S$GLB,,, | Performed by: PODIATRIST

## 2022-04-20 PROCEDURE — 3008F BODY MASS INDEX DOCD: CPT | Mod: CPTII,S$GLB,, | Performed by: PODIATRIST

## 2022-04-20 PROCEDURE — 3077F SYST BP >= 140 MM HG: CPT | Mod: CPTII,S$GLB,, | Performed by: PODIATRIST

## 2022-04-20 PROCEDURE — 1159F MED LIST DOCD IN RCRD: CPT | Mod: CPTII,S$GLB,, | Performed by: PODIATRIST

## 2022-04-20 PROCEDURE — 73630 XR FOOT COMPLETE 3 VIEW RIGHT: ICD-10-PCS | Mod: 26,RT,, | Performed by: RADIOLOGY

## 2022-04-20 PROCEDURE — 1160F PR REVIEW ALL MEDS BY PRESCRIBER/CLIN PHARMACIST DOCUMENTED: ICD-10-PCS | Mod: CPTII,S$GLB,, | Performed by: PODIATRIST

## 2022-04-20 PROCEDURE — 99999 PR PBB SHADOW E&M-EST. PATIENT-LVL III: ICD-10-PCS | Mod: PBBFAC,,, | Performed by: PODIATRIST

## 2022-04-20 PROCEDURE — 73630 X-RAY EXAM OF FOOT: CPT | Mod: TC,FY,RT

## 2022-04-20 PROCEDURE — 3008F PR BODY MASS INDEX (BMI) DOCUMENTED: ICD-10-PCS | Mod: CPTII,S$GLB,, | Performed by: PODIATRIST

## 2022-04-20 PROCEDURE — 1160F RVW MEDS BY RX/DR IN RCRD: CPT | Mod: CPTII,S$GLB,, | Performed by: PODIATRIST

## 2022-04-20 RX ORDER — LEVOTHYROXINE SODIUM 75 UG/1
TABLET ORAL
COMMUNITY
Start: 2022-03-14

## 2022-04-20 RX ORDER — GABAPENTIN 600 MG/1
TABLET ORAL
COMMUNITY
End: 2023-02-07

## 2022-04-20 RX ORDER — OMEPRAZOLE 40 MG/1
CAPSULE, DELAYED RELEASE ORAL
COMMUNITY
Start: 2022-03-14 | End: 2022-08-24 | Stop reason: SDUPTHER

## 2022-04-20 RX ORDER — OMEPRAZOLE 40 MG/1
1 CAPSULE, DELAYED RELEASE ORAL
COMMUNITY
Start: 2022-03-14

## 2022-04-20 RX ORDER — DORZOLAMIDE HCL 20 MG/ML
SOLUTION/ DROPS OPHTHALMIC
COMMUNITY
Start: 2022-03-10

## 2022-04-20 RX ORDER — CALCIUM CARBONATE 500(1250)
TABLET ORAL
COMMUNITY

## 2022-04-20 RX ORDER — NITROFURANTOIN 25; 75 MG/1; MG/1
100 CAPSULE ORAL EVERY 12 HOURS
COMMUNITY
Start: 2022-01-03 | End: 2022-08-24 | Stop reason: ALTCHOICE

## 2022-04-20 RX ORDER — METOPROLOL TARTRATE 25 MG/1
25 TABLET, FILM COATED ORAL 2 TIMES DAILY
COMMUNITY
Start: 2022-03-07

## 2022-04-20 RX ORDER — DIAZEPAM 10 MG/1
TABLET ORAL
COMMUNITY

## 2022-04-20 RX ORDER — DIAZEPAM 10 MG/1
10 TABLET ORAL 2 TIMES DAILY PRN
COMMUNITY
Start: 2022-03-07 | End: 2022-08-24 | Stop reason: SDUPTHER

## 2022-04-20 RX ORDER — FLUTICASONE PROPIONATE AND SALMETEROL 250; 50 UG/1; UG/1
1 POWDER RESPIRATORY (INHALATION)
COMMUNITY
Start: 2022-03-14 | End: 2022-08-24 | Stop reason: SDUPTHER

## 2022-04-20 RX ORDER — METOPROLOL TARTRATE 25 MG/1
TABLET, FILM COATED ORAL
COMMUNITY
Start: 2022-03-14 | End: 2022-08-24 | Stop reason: SDUPTHER

## 2022-04-20 RX ORDER — MELOXICAM 15 MG/1
TABLET ORAL
COMMUNITY
End: 2022-11-07 | Stop reason: SDUPTHER

## 2022-04-20 RX ORDER — DULOXETIN HYDROCHLORIDE 60 MG/1
60 CAPSULE, DELAYED RELEASE ORAL EVERY MORNING
COMMUNITY
Start: 2022-03-10

## 2022-04-20 RX ORDER — NYSTATIN 100000 U/G
CREAM TOPICAL 2 TIMES DAILY
COMMUNITY
Start: 2022-01-03

## 2022-04-20 RX ORDER — LATANOPROST 50 UG/ML
SOLUTION/ DROPS OPHTHALMIC
COMMUNITY
Start: 2022-01-26

## 2022-04-20 RX ORDER — ERGOCALCIFEROL 1.25 MG/1
CAPSULE ORAL
COMMUNITY
End: 2023-02-07

## 2022-04-20 RX ORDER — IVERMECTIN 3 MG/1
TABLET ORAL
COMMUNITY
Start: 2021-11-29 | End: 2022-08-24 | Stop reason: ALTCHOICE

## 2022-04-20 RX ORDER — DICLOFENAC SODIUM 10 MG/G
GEL TOPICAL
COMMUNITY
Start: 2021-08-26

## 2022-04-23 PROBLEM — M79.89 PAIN AND SWELLING OF TOE OF RIGHT FOOT: Status: ACTIVE | Noted: 2022-04-23

## 2022-04-23 PROBLEM — L97.511 ULCER OF RIGHT FOOT, LIMITED TO BREAKDOWN OF SKIN: Status: ACTIVE | Noted: 2022-04-23

## 2022-04-23 PROBLEM — B35.3 TINEA PEDIS OF BOTH FEET: Status: ACTIVE | Noted: 2022-04-23

## 2022-04-23 PROBLEM — M79.674 PAIN AND SWELLING OF TOE OF RIGHT FOOT: Status: ACTIVE | Noted: 2022-04-23

## 2022-04-24 NOTE — PROGRESS NOTES
Subjective:       Patient ID: Hilda Cheek is a 66 y.o. female.    Chief Complaint: Nail Problem, Callouses, and Foot Pain  Patient presents for a new patient evaluation she is complaining about a painful 5th digit on both feet however the right is worse than the left she states she also is concerned about an ingrown toenail on her right great toe and has fungus on her toenails.    Past Medical History:   Diagnosis Date    Arthritis     COPD (chronic obstructive pulmonary disease)     Hypertension     Thyroid disease      Past Surgical History:   Procedure Laterality Date    OOPHORECTOMY Left     age 21     Family History   Problem Relation Age of Onset    Breast cancer Neg Hx     Ovarian cancer Neg Hx      Social History     Socioeconomic History    Marital status:    Tobacco Use    Smoking status: Current Some Day Smoker    Smokeless tobacco: Never Used       Current Outpatient Medications   Medication Sig Dispense Refill    albuterol (PROVENTIL/VENTOLIN HFA) 90 mcg/actuation inhaler inhale 2 puff by inhalation route  every 4 - 6 hours as needed as needed      calcium carbonate (OS-KAYE) 500 mg calcium (1,250 mg) tablet       cariprazine (VRAYLAR) 1.5 mg Cap Take 1 capsule by mouth.      diazePAM (VALIUM) 10 MG Tab Take by mouth.      diazePAM (VALIUM) 5 MG tablet Take by mouth.      diclofenac sodium (VOLTAREN) 1 % Gel Apply topically.      dicyclomine (BENTYL) 20 mg tablet dicyclomine 20 mg tablet      dorzolamide (TRUSOPT) 2 % ophthalmic solution INSTILL 1 DROPS INTO EACH EYE TWICE DAILY      DULoxetine (CYMBALTA) 60 MG capsule Take 60 mg by mouth every morning.      ergocalciferol (ERGOCALCIFEROL) 50,000 unit Cap       esomeprazole (NEXIUM) 40 MG capsule Nexium 40 mg capsule,delayed release      EUTHYROX 75 mcg tablet Take 1 tablet by mouth once daily 90 tablet 0    fluticasone-salmeterol diskus inhaler 250-50 mcg Inhale 1 puff into the lungs.      fluticasone-salmeterol  "diskus inhaler 250-50 mcg Inhale 1 puff into the lungs.      gabapentin (NEURONTIN) 600 MG tablet Take by mouth.      hydrOXYzine HCL (ATARAX) 25 MG tablet Take 2 tablets (50 mg total) by mouth every 6 (six) hours. 12 tablet 0    ivermectin (STROMECTOL) 3 mg Tab Take by mouth.      latanoprost 0.005 % ophthalmic solution INSTILL 1 DROP INTO EACH EYE AT BEDTIME      levothyroxine (SYNTHROID) 75 MCG tablet Take by mouth.      meloxicam (MOBIC) 15 MG tablet Take by mouth.      metoprolol tartrate (LOPRESSOR) 25 MG tablet Take 25 mg by mouth 2 (two) times daily.      metoprolol tartrate (LOPRESSOR) 25 MG tablet Take by mouth.      nitrofurantoin, macrocrystal-monohydrate, (MACROBID) 100 MG capsule Take 100 mg by mouth every 12 (twelve) hours.      nystatin (MYCOSTATIN) cream Apply topically 2 (two) times daily.      omeprazole (PRILOSEC) 40 MG capsule Take 1 capsule by mouth.      omeprazole (PRILOSEC) 40 MG capsule       permethrin (ELIMITE) 5 % cream Apply to affected area once 60 g 0    VALIUM 10 mg Tab Take 10 mg by mouth 2 (two) times daily as needed.       No current facility-administered medications for this visit.     Review of patient's allergies indicates:   Allergen Reactions    Sulfa (sulfonamide antibiotics)      Other reaction(s): Unknown       Review of Systems   Musculoskeletal: Positive for arthralgias.   Skin: Positive for color change and wound.   All other systems reviewed and are negative.      Objective:      Vitals:    04/20/22 1317   BP: (!) 141/91   Pulse: 65   Temp: 97.9 °F (36.6 °C)   Weight: 89.8 kg (198 lb)   Height: 5' 3" (1.6 m)     Physical Exam  Vitals and nursing note reviewed.   Constitutional:       Appearance: Normal appearance.   Cardiovascular:      Pulses:           Dorsalis pedis pulses are 2+ on the right side and 2+ on the left side.        Posterior tibial pulses are 1+ on the right side and 1+ on the left side.   Pulmonary:      Effort: Pulmonary effort is " normal.   Musculoskeletal:         General: Swelling, tenderness and signs of injury present.      Right foot: Deformity present.      Left foot: Deformity present.   Feet:      Right foot:      Protective Sensation: 2 sites tested. 2 sites sensed.      Skin integrity: Ulcer, skin breakdown and erythema present.      Toenail Condition: Right toenails are ingrown. Fungal disease present.     Left foot:      Protective Sensation: 2 sites tested. 2 sites sensed.      Skin integrity: Ulcer, skin breakdown and erythema present.      Toenail Condition: Fungal disease present.  Skin:     Capillary Refill: Capillary refill takes 2 to 3 seconds.      Findings: Erythema present.   Neurological:      General: No focal deficit present.      Mental Status: She is alert.   Psychiatric:         Mood and Affect: Mood normal.         Behavior: Behavior normal.         Thought Content: Thought content normal.         Judgment: Judgment normal.                              Assessment:       1. Pain and swelling of toe of right foot    2. Tinea pedis of both feet    3. Ulcer of right foot, limited to breakdown of skin        Plan:        Patient presents for a new patient evaluation she is complaining about a painful 5th digit on both feet however the right is worse than the left she states she also is concerned about an ingrown toenail on her right great toe and has fungus on her toenails.  Comprehensive new patient evaluation was performed patient advised that she does have findings consistent with fungal infection in the 4th webspace bilateral she actually has an ulceration in the 4th webspace on the right foot this is significantly worse I advised the patient there is no obvious signs of bacterial involvement however she is at significant risk for bacterial infection due to the skin breakdown and fungal involvement.  Patient will begin playing Betadine daily to the web spaces bilateral I have advised her within 7 days this should  be resolved she needs to make sure she dries between her toes well after bathing if it is not doing better she is to continue to apply as directed until it dries up and is completely resolved.  Patient also had ingrowing toenail medial right hallux this was debrided today patient did not require a nail avulsion she does have toenail fungus and multiple nails I have recommended the application of Vicks vapor rub twice a day for 4-6 months at which time she should start to see positive results of reduced fungal involvement.  Patient does have degenerative arthritic changes and digital contractures which certainly are contributing factor to the breakdown in the 4th webspace bilateral.  X-rays reviewed and discussed with the patient plan follow-up will be as needed patient advised if this is not resolved within the next 10-14 days to contact us for further evaluation and treatment as needed.This note was created using [employer] voice recognition software that occasionally misinterpreted phrases or words.

## 2022-06-07 ENCOUNTER — HOSPITAL ENCOUNTER (OUTPATIENT)
Dept: RADIOLOGY | Facility: HOSPITAL | Age: 67
Discharge: HOME | End: 2022-06-07
Attending: NURSE PRACTITIONER
Payer: MEDICARE

## 2022-06-07 DIAGNOSIS — J45.30 MILD PERSISTENT ASTHMA: ICD-10-CM

## 2022-06-07 PROCEDURE — 71046 X-RAY EXAM CHEST 2 VIEWS: CPT | Mod: 26,,, | Performed by: RADIOLOGY

## 2022-06-07 PROCEDURE — 71046 X-RAY EXAM CHEST 2 VIEWS: CPT | Mod: TC,FY

## 2022-06-07 PROCEDURE — 71046 XR CHEST PA AND LATERAL: ICD-10-PCS | Mod: 26,,, | Performed by: RADIOLOGY

## 2022-06-30 DIAGNOSIS — M25.562 PAIN IN BOTH KNEES, UNSPECIFIED CHRONICITY: Primary | ICD-10-CM

## 2022-06-30 DIAGNOSIS — M25.561 PAIN IN BOTH KNEES, UNSPECIFIED CHRONICITY: Primary | ICD-10-CM

## 2022-07-06 ENCOUNTER — OFFICE VISIT (OUTPATIENT)
Dept: ORTHOPEDICS | Facility: CLINIC | Age: 67
End: 2022-07-06
Payer: MEDICARE

## 2022-07-06 ENCOUNTER — HOSPITAL ENCOUNTER (OUTPATIENT)
Dept: RADIOLOGY | Facility: HOSPITAL | Age: 67
Discharge: HOME | End: 2022-07-06
Attending: ORTHOPAEDIC SURGERY
Payer: MEDICARE

## 2022-07-06 VITALS — WEIGHT: 198 LBS | BODY MASS INDEX: 35.08 KG/M2 | HEIGHT: 63 IN | RESPIRATION RATE: 16 BRPM

## 2022-07-06 DIAGNOSIS — M25.561 PAIN IN BOTH KNEES, UNSPECIFIED CHRONICITY: ICD-10-CM

## 2022-07-06 DIAGNOSIS — M70.52 PES ANSERINUS BURSITIS OF BOTH KNEES: Primary | ICD-10-CM

## 2022-07-06 DIAGNOSIS — G89.29 CHRONIC PAIN OF BOTH KNEES: ICD-10-CM

## 2022-07-06 DIAGNOSIS — M25.561 CHRONIC PAIN OF BOTH KNEES: ICD-10-CM

## 2022-07-06 DIAGNOSIS — M17.0 PRIMARY OSTEOARTHRITIS OF BOTH KNEES: ICD-10-CM

## 2022-07-06 DIAGNOSIS — M70.51 PES ANSERINUS BURSITIS OF BOTH KNEES: Primary | ICD-10-CM

## 2022-07-06 DIAGNOSIS — M25.562 PAIN IN BOTH KNEES, UNSPECIFIED CHRONICITY: ICD-10-CM

## 2022-07-06 DIAGNOSIS — M25.562 CHRONIC PAIN OF BOTH KNEES: ICD-10-CM

## 2022-07-06 PROCEDURE — 73562 X-RAY EXAM OF KNEE 3: CPT | Mod: TC,50

## 2022-07-06 PROCEDURE — 3008F PR BODY MASS INDEX (BMI) DOCUMENTED: ICD-10-PCS | Mod: CPTII,S$GLB,, | Performed by: ORTHOPAEDIC SURGERY

## 2022-07-06 PROCEDURE — 99204 OFFICE O/P NEW MOD 45 MIN: CPT | Mod: 25,S$GLB,, | Performed by: ORTHOPAEDIC SURGERY

## 2022-07-06 PROCEDURE — 1101F PR PT FALLS ASSESS DOC 0-1 FALLS W/OUT INJ PAST YR: ICD-10-PCS | Mod: CPTII,S$GLB,, | Performed by: ORTHOPAEDIC SURGERY

## 2022-07-06 PROCEDURE — 99204 PR OFFICE/OUTPT VISIT, NEW, LEVL IV, 45-59 MIN: ICD-10-PCS | Mod: 25,S$GLB,, | Performed by: ORTHOPAEDIC SURGERY

## 2022-07-06 PROCEDURE — 3288F FALL RISK ASSESSMENT DOCD: CPT | Mod: CPTII,S$GLB,, | Performed by: ORTHOPAEDIC SURGERY

## 2022-07-06 PROCEDURE — 1101F PT FALLS ASSESS-DOCD LE1/YR: CPT | Mod: CPTII,S$GLB,, | Performed by: ORTHOPAEDIC SURGERY

## 2022-07-06 PROCEDURE — 73562 XR KNEE 3 VIEW BILATERAL: ICD-10-PCS | Mod: 26,50,, | Performed by: RADIOLOGY

## 2022-07-06 PROCEDURE — 99999 PR PBB SHADOW E&M-EST. PATIENT-LVL II: ICD-10-PCS | Mod: PBBFAC,,, | Performed by: ORTHOPAEDIC SURGERY

## 2022-07-06 PROCEDURE — 99999 PR PBB SHADOW E&M-EST. PATIENT-LVL II: CPT | Mod: PBBFAC,,, | Performed by: ORTHOPAEDIC SURGERY

## 2022-07-06 PROCEDURE — 73562 X-RAY EXAM OF KNEE 3: CPT | Mod: 26,50,, | Performed by: RADIOLOGY

## 2022-07-06 PROCEDURE — 1159F PR MEDICATION LIST DOCUMENTED IN MEDICAL RECORD: ICD-10-PCS | Mod: CPTII,S$GLB,, | Performed by: ORTHOPAEDIC SURGERY

## 2022-07-06 PROCEDURE — 3008F BODY MASS INDEX DOCD: CPT | Mod: CPTII,S$GLB,, | Performed by: ORTHOPAEDIC SURGERY

## 2022-07-06 PROCEDURE — 1159F MED LIST DOCD IN RCRD: CPT | Mod: CPTII,S$GLB,, | Performed by: ORTHOPAEDIC SURGERY

## 2022-07-06 PROCEDURE — 20610 LARGE JOINT ASPIRATION/INJECTION: BILATERAL ANSERINE BURSA: ICD-10-PCS | Mod: 50,S$GLB,, | Performed by: ORTHOPAEDIC SURGERY

## 2022-07-06 PROCEDURE — 20610 DRAIN/INJ JOINT/BURSA W/O US: CPT | Mod: 50,S$GLB,, | Performed by: ORTHOPAEDIC SURGERY

## 2022-07-06 PROCEDURE — 3288F PR FALLS RISK ASSESSMENT DOCUMENTED: ICD-10-PCS | Mod: CPTII,S$GLB,, | Performed by: ORTHOPAEDIC SURGERY

## 2022-07-06 RX ORDER — TRIAMCINOLONE ACETONIDE 40 MG/ML
40 INJECTION, SUSPENSION INTRA-ARTICULAR; INTRAMUSCULAR
Status: DISCONTINUED | OUTPATIENT
Start: 2022-07-06 | End: 2022-07-06 | Stop reason: HOSPADM

## 2022-07-06 RX ADMIN — TRIAMCINOLONE ACETONIDE 40 MG: 40 INJECTION, SUSPENSION INTRA-ARTICULAR; INTRAMUSCULAR at 11:07

## 2022-07-06 NOTE — PROCEDURES
Large Joint Aspiration/Injection: bilateral anserine bursa    Date/Time: 7/6/2022 11:00 AM  Performed by: Sravan Lo DO  Authorized by: Sravan Lo, DO     Consent Done?:  Yes (Verbal)  Indications:  Diagnostic evaluation and pain  Site marked: the procedure site was marked    Timeout: prior to procedure the correct patient, procedure, and site was verified    Prep: patient was prepped and draped in usual sterile fashion      Details:  Needle Size:  22 G  Ultrasonic Guidance for needle placement?: No    Approach:  Anteromedial  Location:  Knee  Laterality:  Bilateral  Site:  Bilateral anserine bursa  Medications (Right):  40 mg triamcinolone acetonide 40 mg/mL  Medications (Left):  40 mg triamcinolone acetonide 40 mg/mL  Patient tolerance:  Patient tolerated the procedure well with no immediate complications

## 2022-07-06 NOTE — PROGRESS NOTES
Subjective:      Patient ID: Hilda Cheek is a 67 y.o. female.    Chief Complaint: Pain of the Right Knee and Pain of the Left Knee    Referring Provider: Macy Omalley Np  [address] [place] [address] [place] [id number]    HPI:  Ms. Cheek is a 67-year-old lady who presented today for evaluation approximately 1 year of bilateral knee pain, left greater than right increasing intensity over the last 3 months.  Her symptoms originally began in the fall of  after she fell on her knees she has fallen multiple times since the last fall was in 2021 after she tripped over her dog.  Her right knee is doing much better but her left knee is still symptomatic.  Flexing her knee or fully straightening it increases her symptoms while rest improves them.  She denies swelling giving way or locking.  She has taken NSAIDs with help.  She has worn a brace with help.  She has not done physical therapy she had an injection several months ago in her right knee which resolved the right knee pain.  Currently she can ambulate approximately 1/2 blocking climb 19 stairs she does not use an ambulatory assistive device.    Past Medical History:   Diagnosis Date    Arthritis     COPD (chronic obstructive pulmonary disease)     Hypertension      *  *  *  *  *  *  *  *  *  *  * Thyroid disease  Seasonal allergies  Depression  Anxiety  Stroke  GERD  IBS  Headaches  Glaucoma right eye  Gout  Endometriosis  Chronic low back pain      Past Surgical History:   Procedure Laterality Date    OOPHORECTOMY Left    *  *  *  *  *  *  * LAPAROSCOPIC SALPINGO-OOPHORECTOMY LEFT  APPENDECTOMY    HYSTEROSCOPY  EGD  COLONOSCOPY  CRPP RIGHT 5TH METACARPAL NECK       Review of patient's allergies indicates:   Allergen Reactions    Sulfa (sulfonamide antibiotics)      Other reaction(s): Unknown       Social History     Occupational History    Disabled dental assistant   Tobacco Use    Smoking status: Current Some Day Smoker     Smokeless tobacco: Never Used   Substance and Sexual Activity    Alcohol use: Social    Drug use: Denies    Sexual activity: Not on file      Family History   Problem Relation Age of Onset    Breast cancer Neg Hx     Ovarian cancer Neg Hx        Previous Hospitalizations:  Childbirth, oophorectomy.     ROS:   Review of Systems   Constitutional: Negative for chills and fever.   HENT: Negative for congestion.    Eyes: Negative for blurred vision and double vision.   Cardiovascular: Negative for chest pain and cyanosis.   Respiratory: Negative for cough and shortness of breath.    Endocrine: Negative for polydipsia.   Hematologic/Lymphatic: Negative for adenopathy.   Skin: Negative for flushing, itching and skin cancer.   Musculoskeletal: Positive for back pain, gout and joint pain.   Gastrointestinal: Positive for heartburn. Negative for constipation and diarrhea.   Genitourinary: Negative for nocturia.   Neurological: Positive for headaches. Negative for seizures.   Psychiatric/Behavioral: Positive for depression. Negative for substance abuse. The patient is nervous/anxious.    Allergic/Immunologic: Positive for environmental allergies.           Objective:      Physical Exam:   General:  Blunted affect, AAOx3.  No acute distress  HEENT: Normocephalic, PEARLA EOMI.  Poor Dentition  Neck: Supple, No JVD  Chest: Symetric, equal excursion on inspiration  Abdomen: Soft NTND  Vascular:  Pulses intact and equal bilaterally.  Capillary refill less than 3 seconds and equal bilaterally  Neurologic:  Pinprick and soft touch intact and equal bilaterally  Integment:  No ecchymosis, no errythema  Extremity:  Knee:  Extension/flexion equal bilaterally 0/118°.  Mild crepitus with motion both knees.  Baker cyst both knees.  Negative patellar load/compression bilaterally.  Negative patella apprehension/relocation bilaterally.  Mild increased excursion with Lachman's/drawer both knees.  Increased excursion with varus stressing  both knees.  No joint line tenderness either knee.  Rosey negative both knees.  Newaygo negative both knees.  Tender at the anserine insertion bilaterally.  Swelling at the anserine insertion bilaterally.  Radiography:  Personally reviewed x-rays of both knees completed on 07/06/2022 showed mild bilateral arthritic changes tricompartmentally.      Assessment:       Impression:      1. Pes anserinus bursitis of both knees    2. Primary osteoarthritis of both knees    3.   4.  Baker cyst, both knees.  Bilateral knee pain.         Plan:       1.  Discussed physical examination and radiographic findings with the patient. Hilda understands that she has pes anserine bursitis of both of her knees with mild arthritic changes.  Treatment alternatives and outcomes were discussed with the patient she understands she could continue with conservative management such as observation, activity modification, NSAIDs, bracing, physical therapy, injections, or she could consider surgical intervention such as arthroscopy.  Recommend she trial conservative care a little bit longer and if she fails further conservative care then consider surgical intervention at that time.  2. Offered a steroid injection to the anserine insertion of both knees, she elected to proceed.  3. Recommend the patient purchase over-the-counter Voltaren gel and applied to her knees twice daily and massage in for 2 minutes.  4. Recommend the patient purchase neoprene sleeve braces for both knees and wear them when ambulating.  5. Take NSAIDs as tolerated allowed by PCM.  6. Follow up p.r.n..

## 2022-09-15 ENCOUNTER — TELEPHONE (OUTPATIENT)
Dept: ORTHOPEDICS | Facility: CLINIC | Age: 67
End: 2022-09-15
Payer: MEDICARE

## 2022-09-15 NOTE — TELEPHONE ENCOUNTER
Returned call. Patient stated she had a missed call from our office. I stated I had not called her today and asked if she need help with anything or if she needed an appointment. Patient stated she did not need an appointment and would call if she needed to follow up with our office.    ----- Message from Peyton Irene sent at 9/15/2022  4:42 PM CDT -----  Who Called: Patient    What is the reqeust in detail: Returning phone call. Patient was unsure who called and what for. Patient mentioned that it could have been about her injections. Please advise.     Can the clinic reply by [facility]? No    Best Call Back Number: [phone]    Additional Information:

## 2022-12-19 ENCOUNTER — HOSPITAL ENCOUNTER (OUTPATIENT)
Dept: RADIOLOGY | Facility: HOSPITAL | Age: 67
Discharge: HOME | End: 2022-12-19
Attending: NURSE PRACTITIONER
Payer: MEDICARE

## 2022-12-19 VITALS — BODY MASS INDEX: 34.76 KG/M2 | WEIGHT: 196.19 LBS | HEIGHT: 63 IN

## 2022-12-19 DIAGNOSIS — Z78.0 MENOPAUSE: ICD-10-CM

## 2022-12-19 DIAGNOSIS — Z12.31 BREAST CANCER SCREENING BY MAMMOGRAM: ICD-10-CM

## 2022-12-19 PROCEDURE — 77067 SCR MAMMO BI INCL CAD: CPT | Mod: TC

## 2022-12-19 PROCEDURE — 77067 MAMMO DIGITAL SCREENING BILAT WITH TOMO: ICD-10-PCS | Mod: 26,,, | Performed by: RADIOLOGY

## 2022-12-19 PROCEDURE — 77063 BREAST TOMOSYNTHESIS BI: CPT | Mod: TC

## 2022-12-19 PROCEDURE — 77080 DXA BONE DENSITY AXIAL: CPT | Mod: 26,,, | Performed by: RADIOLOGY

## 2022-12-19 PROCEDURE — 77063 MAMMO DIGITAL SCREENING BILAT WITH TOMO: ICD-10-PCS | Mod: 26,,, | Performed by: RADIOLOGY

## 2022-12-19 PROCEDURE — 77067 SCR MAMMO BI INCL CAD: CPT | Mod: 26,,, | Performed by: RADIOLOGY

## 2022-12-19 PROCEDURE — 77080 DEXA BONE DENSITY SPINE HIP: ICD-10-PCS | Mod: 26,,, | Performed by: RADIOLOGY

## 2022-12-19 PROCEDURE — 77080 DXA BONE DENSITY AXIAL: CPT | Mod: TC

## 2022-12-19 PROCEDURE — 77063 BREAST TOMOSYNTHESIS BI: CPT | Mod: 26,,, | Performed by: RADIOLOGY

## 2022-12-22 ENCOUNTER — HOSPITAL ENCOUNTER (OUTPATIENT)
Dept: RADIOLOGY | Facility: HOSPITAL | Age: 67
Discharge: HOME | End: 2022-12-22
Attending: NURSE PRACTITIONER
Payer: MEDICARE

## 2022-12-22 DIAGNOSIS — R92.8 OTHER ABNORMAL AND INCONCLUSIVE FINDINGS ON DIAGNOSTIC IMAGING OF BREAST: ICD-10-CM

## 2022-12-22 PROCEDURE — 77061 MAMMO DIGITAL DIAGNOSTIC LEFT WITH TOMO: ICD-10-PCS | Mod: 26,LT,, | Performed by: RADIOLOGY

## 2022-12-22 PROCEDURE — 77065 MAMMO DIGITAL DIAGNOSTIC LEFT WITH TOMO: ICD-10-PCS | Mod: 26,LT,, | Performed by: RADIOLOGY

## 2022-12-22 PROCEDURE — 77065 DX MAMMO INCL CAD UNI: CPT | Mod: TC,LT

## 2022-12-22 PROCEDURE — 77061 BREAST TOMOSYNTHESIS UNI: CPT | Mod: 26,LT,, | Performed by: RADIOLOGY

## 2022-12-22 PROCEDURE — 76642 US BREAST LEFT LIMITED: ICD-10-PCS | Mod: 26,LT,, | Performed by: RADIOLOGY

## 2022-12-22 PROCEDURE — 76642 ULTRASOUND BREAST LIMITED: CPT | Mod: 26,LT,, | Performed by: RADIOLOGY

## 2022-12-22 PROCEDURE — 77065 DX MAMMO INCL CAD UNI: CPT | Mod: 26,LT,, | Performed by: RADIOLOGY

## 2022-12-22 PROCEDURE — 76642 ULTRASOUND BREAST LIMITED: CPT | Mod: TC,LT

## 2023-01-10 ENCOUNTER — HOSPITAL ENCOUNTER (OUTPATIENT)
Dept: RADIOLOGY | Facility: HOSPITAL | Age: 68
Discharge: HOME | End: 2023-01-10
Attending: NURSE PRACTITIONER
Payer: MEDICARE

## 2023-01-10 DIAGNOSIS — K76.0 FATTY LIVER: ICD-10-CM

## 2023-01-10 PROCEDURE — 76700 US ABDOMEN COMPLETE: ICD-10-PCS | Mod: 26,,, | Performed by: RADIOLOGY

## 2023-01-10 PROCEDURE — 76700 US EXAM ABDOM COMPLETE: CPT | Mod: 26,,, | Performed by: RADIOLOGY

## 2023-01-10 PROCEDURE — 76700 US EXAM ABDOM COMPLETE: CPT | Mod: TC

## 2023-01-13 ENCOUNTER — HOSPITAL ENCOUNTER (OUTPATIENT)
Dept: RADIOLOGY | Facility: HOSPITAL | Age: 68
Discharge: HOME | End: 2023-01-13
Attending: NURSE PRACTITIONER
Payer: MEDICARE

## 2023-01-13 DIAGNOSIS — N63.0 BREAST MASS SEEN ON MAMMOGRAM: ICD-10-CM

## 2023-01-13 DIAGNOSIS — R92.8 ABNORMAL FINDING ON RADIOLOGICAL EXAMINATION OF BREAST: ICD-10-CM

## 2023-01-13 DIAGNOSIS — Z98.890 STATUS POST BREAST BIOPSY: ICD-10-CM

## 2023-01-13 DIAGNOSIS — R92.8 ABNORMAL ULTRASOUND OF BREAST: ICD-10-CM

## 2023-01-13 PROCEDURE — 88305 TISSUE EXAM BY PATHOLOGIST: CPT | Mod: 26,,, | Performed by: STUDENT IN AN ORGANIZED HEALTH CARE EDUCATION/TRAINING PROGRAM

## 2023-01-13 PROCEDURE — 88341 PR IHC OR ICC EACH ADD'L SINGLE ANTIBODY  STAINPR: ICD-10-PCS | Mod: 26,,, | Performed by: STUDENT IN AN ORGANIZED HEALTH CARE EDUCATION/TRAINING PROGRAM

## 2023-01-13 PROCEDURE — 88341 IMHCHEM/IMCYTCHM EA ADD ANTB: CPT | Mod: 59 | Performed by: STUDENT IN AN ORGANIZED HEALTH CARE EDUCATION/TRAINING PROGRAM

## 2023-01-13 PROCEDURE — 88305 TISSUE EXAM BY PATHOLOGIST: ICD-10-PCS | Mod: 26,,, | Performed by: STUDENT IN AN ORGANIZED HEALTH CARE EDUCATION/TRAINING PROGRAM

## 2023-01-13 PROCEDURE — 19083 US BREAST BIOPSY WITH IMAGING 1ST SITE LEFT: ICD-10-PCS | Mod: LT,,, | Performed by: RADIOLOGY

## 2023-01-13 PROCEDURE — 77061 BREAST TOMOSYNTHESIS UNI: CPT | Mod: TC,LT

## 2023-01-13 PROCEDURE — 25000003 PHARM REV CODE 250: Performed by: RADIOLOGY

## 2023-01-13 PROCEDURE — 88342 IMHCHEM/IMCYTCHM 1ST ANTB: CPT | Mod: 26,,, | Performed by: STUDENT IN AN ORGANIZED HEALTH CARE EDUCATION/TRAINING PROGRAM

## 2023-01-13 PROCEDURE — 88342 IMHCHEM/IMCYTCHM 1ST ANTB: CPT | Performed by: STUDENT IN AN ORGANIZED HEALTH CARE EDUCATION/TRAINING PROGRAM

## 2023-01-13 PROCEDURE — 77061 MAMMO DIGITAL DIAGNOSTIC LEFT WITH TOMO: ICD-10-PCS | Mod: 26,LT,, | Performed by: RADIOLOGY

## 2023-01-13 PROCEDURE — 77065 DX MAMMO INCL CAD UNI: CPT | Mod: 26,LT,, | Performed by: RADIOLOGY

## 2023-01-13 PROCEDURE — 19083 BX BREAST 1ST LESION US IMAG: CPT | Mod: LT,,, | Performed by: RADIOLOGY

## 2023-01-13 PROCEDURE — 77065 MAMMO DIGITAL DIAGNOSTIC LEFT WITH TOMO: ICD-10-PCS | Mod: 26,LT,, | Performed by: RADIOLOGY

## 2023-01-13 PROCEDURE — 19083 BX BREAST 1ST LESION US IMAG: CPT | Mod: LT

## 2023-01-13 PROCEDURE — 88377 M/PHMTRC ALYS ISHQUANT/SEMIQ: CPT | Performed by: STUDENT IN AN ORGANIZED HEALTH CARE EDUCATION/TRAINING PROGRAM

## 2023-01-13 PROCEDURE — 88342 CHG IMMUNOCYTOCHEMISTRY: ICD-10-PCS | Mod: 26,,, | Performed by: STUDENT IN AN ORGANIZED HEALTH CARE EDUCATION/TRAINING PROGRAM

## 2023-01-13 PROCEDURE — 88341 IMHCHEM/IMCYTCHM EA ADD ANTB: CPT | Mod: 26,,, | Performed by: STUDENT IN AN ORGANIZED HEALTH CARE EDUCATION/TRAINING PROGRAM

## 2023-01-13 PROCEDURE — 88305 TISSUE EXAM BY PATHOLOGIST: CPT | Performed by: STUDENT IN AN ORGANIZED HEALTH CARE EDUCATION/TRAINING PROGRAM

## 2023-01-13 PROCEDURE — 77061 BREAST TOMOSYNTHESIS UNI: CPT | Mod: 26,LT,, | Performed by: RADIOLOGY

## 2023-01-13 RX ORDER — LIDOCAINE HYDROCHLORIDE 20 MG/ML
10 INJECTION, SOLUTION INFILTRATION; PERINEURAL ONCE
Status: COMPLETED | OUTPATIENT
Start: 2023-01-13 | End: 2023-01-13

## 2023-01-13 RX ADMIN — LIDOCAINE HYDROCHLORIDE 10 ML: 20 INJECTION, SOLUTION INFILTRATION; PERINEURAL at 01:01

## 2023-01-19 ENCOUNTER — HOSPITAL ENCOUNTER (OUTPATIENT)
Dept: RADIOLOGY | Facility: HOSPITAL | Age: 68
Discharge: HOME | End: 2023-01-19
Attending: NURSE PRACTITIONER
Payer: MEDICARE

## 2023-01-19 DIAGNOSIS — R93.41 ABNORMAL RADIOLOGIC FINDINGS ON DIAGNOSTIC IMAGING OF RENAL PELVIS, URETER, OR BLADDER: ICD-10-CM

## 2023-01-19 PROCEDURE — 74177 CT ABD & PELVIS W/CONTRAST: CPT | Mod: 26,,, | Performed by: RADIOLOGY

## 2023-01-19 PROCEDURE — 25500020 PHARM REV CODE 255

## 2023-01-19 PROCEDURE — 74177 CT ABDOMEN PELVIS WITH CONTRAST: ICD-10-PCS | Mod: 26,,, | Performed by: RADIOLOGY

## 2023-01-19 PROCEDURE — 74177 CT ABD & PELVIS W/CONTRAST: CPT | Mod: TC

## 2023-01-19 RX ADMIN — IOHEXOL 100 ML: 350 INJECTION, SOLUTION INTRAVENOUS at 12:01

## 2023-02-03 LAB
FINAL PATHOLOGIC DIAGNOSIS: NORMAL
GROSS: NORMAL
Lab: NORMAL
SUPPLEMENTAL DIAGNOSIS: NORMAL

## 2023-04-27 ENCOUNTER — HOSPITAL ENCOUNTER (OUTPATIENT)
Dept: RADIOLOGY | Facility: HOSPITAL | Age: 68
Discharge: HOME | End: 2023-04-27
Attending: NURSE PRACTITIONER
Payer: MEDICARE

## 2023-04-27 DIAGNOSIS — M25.511 PAIN IN RIGHT SHOULDER: ICD-10-CM

## 2023-04-27 PROCEDURE — 73030 X-RAY EXAM OF SHOULDER: CPT | Mod: 26,RT,, | Performed by: RADIOLOGY

## 2023-04-27 PROCEDURE — 73030 X-RAY EXAM OF SHOULDER: CPT | Mod: TC,RT

## 2023-04-27 PROCEDURE — 73030 XR SHOULDER COMPLETE 2 OR MORE VIEWS RIGHT: ICD-10-PCS | Mod: 26,RT,, | Performed by: RADIOLOGY

## 2023-05-17 ENCOUNTER — OFFICE VISIT (OUTPATIENT)
Dept: ORTHOPEDICS | Facility: CLINIC | Age: 68
End: 2023-05-17
Payer: MEDICARE

## 2023-05-17 VITALS — WEIGHT: 196.19 LBS | BODY MASS INDEX: 34.76 KG/M2 | RESPIRATION RATE: 16 BRPM | HEIGHT: 63 IN

## 2023-05-17 DIAGNOSIS — M75.21 BICEPS TENDONITIS, RIGHT: ICD-10-CM

## 2023-05-17 DIAGNOSIS — M25.511 CHRONIC RIGHT SHOULDER PAIN: ICD-10-CM

## 2023-05-17 DIAGNOSIS — M25.811 IMPINGEMENT OF RIGHT SHOULDER: ICD-10-CM

## 2023-05-17 DIAGNOSIS — M19.011 GLENOHUMERAL ARTHRITIS, RIGHT: ICD-10-CM

## 2023-05-17 DIAGNOSIS — G89.29 CHRONIC RIGHT SHOULDER PAIN: ICD-10-CM

## 2023-05-17 DIAGNOSIS — M75.81 ROTATOR CUFF TENDINITIS, RIGHT: Primary | ICD-10-CM

## 2023-05-17 PROCEDURE — 3288F PR FALLS RISK ASSESSMENT DOCUMENTED: ICD-10-PCS | Mod: CPTII,,, | Performed by: ORTHOPAEDIC SURGERY

## 2023-05-17 PROCEDURE — 1159F MED LIST DOCD IN RCRD: CPT | Mod: CPTII,,, | Performed by: ORTHOPAEDIC SURGERY

## 2023-05-17 PROCEDURE — 20610 LARGE JOINT ASPIRATION/INJECTION: R GLENOHUMERAL: ICD-10-PCS | Mod: RT,,, | Performed by: ORTHOPAEDIC SURGERY

## 2023-05-17 PROCEDURE — 3008F BODY MASS INDEX DOCD: CPT | Mod: CPTII,,, | Performed by: ORTHOPAEDIC SURGERY

## 2023-05-17 PROCEDURE — 1125F PR PAIN SEVERITY QUANTIFIED, PAIN PRESENT: ICD-10-PCS | Mod: CPTII,,, | Performed by: ORTHOPAEDIC SURGERY

## 2023-05-17 PROCEDURE — 3008F PR BODY MASS INDEX (BMI) DOCUMENTED: ICD-10-PCS | Mod: CPTII,,, | Performed by: ORTHOPAEDIC SURGERY

## 2023-05-17 PROCEDURE — 99999 PR PBB SHADOW E&M-EST. PATIENT-LVL III: CPT | Mod: PBBFAC,,, | Performed by: ORTHOPAEDIC SURGERY

## 2023-05-17 PROCEDURE — 3288F FALL RISK ASSESSMENT DOCD: CPT | Mod: CPTII,,, | Performed by: ORTHOPAEDIC SURGERY

## 2023-05-17 PROCEDURE — 1101F PT FALLS ASSESS-DOCD LE1/YR: CPT | Mod: CPTII,,, | Performed by: ORTHOPAEDIC SURGERY

## 2023-05-17 PROCEDURE — 99999 PR PBB SHADOW E&M-EST. PATIENT-LVL III: ICD-10-PCS | Mod: PBBFAC,,, | Performed by: ORTHOPAEDIC SURGERY

## 2023-05-17 PROCEDURE — 1125F AMNT PAIN NOTED PAIN PRSNT: CPT | Mod: CPTII,,, | Performed by: ORTHOPAEDIC SURGERY

## 2023-05-17 PROCEDURE — 20610 DRAIN/INJ JOINT/BURSA W/O US: CPT | Mod: RT,,, | Performed by: ORTHOPAEDIC SURGERY

## 2023-05-17 PROCEDURE — 99214 OFFICE O/P EST MOD 30 MIN: CPT | Mod: 25,,, | Performed by: ORTHOPAEDIC SURGERY

## 2023-05-17 PROCEDURE — 99214 PR OFFICE/OUTPT VISIT, EST, LEVL IV, 30-39 MIN: ICD-10-PCS | Mod: 25,,, | Performed by: ORTHOPAEDIC SURGERY

## 2023-05-17 PROCEDURE — 1159F PR MEDICATION LIST DOCUMENTED IN MEDICAL RECORD: ICD-10-PCS | Mod: CPTII,,, | Performed by: ORTHOPAEDIC SURGERY

## 2023-05-17 PROCEDURE — 1101F PR PT FALLS ASSESS DOC 0-1 FALLS W/OUT INJ PAST YR: ICD-10-PCS | Mod: CPTII,,, | Performed by: ORTHOPAEDIC SURGERY

## 2023-05-17 RX ORDER — TRIAMCINOLONE ACETONIDE 40 MG/ML
40 INJECTION, SUSPENSION INTRA-ARTICULAR; INTRAMUSCULAR
Status: DISCONTINUED | OUTPATIENT
Start: 2023-05-17 | End: 2023-05-17 | Stop reason: HOSPADM

## 2023-05-17 RX ADMIN — TRIAMCINOLONE ACETONIDE 40 MG: 40 INJECTION, SUSPENSION INTRA-ARTICULAR; INTRAMUSCULAR at 11:05

## 2023-05-17 NOTE — PROGRESS NOTES
Subjective:      Patient ID: Hilda Cheek is a 67 y.o. female.    Chief Complaint: Pain of the Right Shoulder      HPI:  Ms. Cheek is right-hand-dominant returns today with new complaints of 20 years of right shoulder pain which was intermittent but a proximally 2 months ago she had a sudden exacerbation after she had breast surgery.  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  Now forward flexion and internal rotation and abduction increases her symptoms while rest improves them.  She has taken NSAIDs with help.  She has not done recent physical therapy, worn a brace.  She had injections in the past which did help.    ROS:  New diagnosis/ surgery/prescriptions since last office visit on 07/06/2022:  Double mastectomy secondary to breast cancer with lymph node dissection.  Bilateral breast reconstruction  Constitutional: Negative for chills and fever.   HENT: Negative for congestion.    Eyes: Negative for blurred vision and double vision.   Cardiovascular: Negative for chest pain and cyanosis.   Respiratory: Negative for cough and shortness of breath.    Endocrine: Negative for polydipsia.   Hematologic/Lymphatic: Negative for adenopathy.   Skin: Negative for flushing, itching and skin cancer.   Musculoskeletal: Positive for back pain, gout and joint pain.   Gastrointestinal: Positive for heartburn. Negative for constipation and diarrhea.   Genitourinary: Negative for nocturia.   Neurological: Positive for headaches. Negative for seizures.   Psychiatric/Behavioral: Positive for depression. Negative for substance abuse. The patient is nervous/anxious.    Allergic/Immunologic: Positive for environmental allergies.       Objective:      Physical Exam:   General: AAOx3.  No acute distress  Vascular:  Pulses intact and equal bilaterally.  Capillary refill less than 3 seconds and equal bilaterally  Neurologic:  Pinprick and  soft touch intact and equal bilaterally  Integment:  No ecchymosis, no errythema  Extremity:   Shoulder:  Forward flexion/ abduction equal bilaterally 0/160 degrees.  Internal rotation right shoulder L1, left shoulder T10.  Negative drop-arm both shoulders.  Negative lift-off both shoulders.  External rotation equal bilaterally 0/15 degrees.  Full can negative both shoulders.  Empty can negative both shoulders.  Deng/ Neer positive right shoulder.  Cross-arm negative both shoulders.  Nontender over the AC joint bilaterally.  Tender in the bicipital groove right shoulder.  Yergason's negative bilaterally.  Apprehension / relocation negative bilaterally.  Radiography:  Personally reviewed  X-rays of the right shoulder completed on 04/27/2023 showed mild glenohumeral arthritic changes and mild greater trochanteric sclerosis consistent with impingement.      Assessment:       Impression:     1. Rotator cuff tendinitis, right    2. Biceps tendonitis, right    3. Impingement of right shoulder    4. Glenohumeral arthritis, right    5. Chronic right shoulder pain          Plan:       1.  Discussed physical examination and radiographic findings with the patient. Hilda understands that she has rotator cuff and biceps tendonitis of her right shoulder.  Treatment alternatives and outcomes were discussed with the patient she understands she could be treated conservatively with observation, activity modification, NSAIDs, bracing, physical therapy, injections, or she could consider surgical intervention such as arthroscopy.  Recommend she trial conservative management a little bit longer and if she fails further conservative care then consider surgical intervention at that time.  2. Offered a steroid injection to the right shoulder, she elected to proceed.  3. Start doing home exercises, pamphlet on home exercise was given to the patient.  4. Continue with NSAIDs as tolerated allowed by PCM.  5. Recommend she purchase  over-the-counter Voltaren gel and applied to her shoulder twice daily and massage in for 2 minutes.    6. Follow up p.r.n..  If she has not improved at next visit may consider referral for an MRI and discuss surgery at that time.

## 2023-05-17 NOTE — PROCEDURES
Large Joint Aspiration/Injection: R glenohumeral    Date/Time: 5/17/2023 11:30 AM  Performed by: Sravan Lo DO  Authorized by: Sravan Lo, DO     Consent Done?:  Yes (Verbal)  Indications:  Diagnostic evaluation and pain  Site marked: the procedure site was marked    Timeout: prior to procedure the correct patient, procedure, and site was verified    Prep: patient was prepped and draped in usual sterile fashion      Details:  Needle Size:  22 G  Ultrasonic Guidance for needle placement?: No    Approach:  Posterior  Location:  Shoulder  Site:  R glenohumeral  Medications:  40 mg triamcinolone acetonide 40 mg/mL  Patient tolerance:  Patient tolerated the procedure well with no immediate complications

## 2023-11-01 ENCOUNTER — HOSPITAL ENCOUNTER (OUTPATIENT)
Dept: RADIOLOGY | Facility: HOSPITAL | Age: 68
Discharge: HOME | End: 2023-11-01
Attending: NURSE PRACTITIONER
Payer: MEDICARE

## 2023-11-01 DIAGNOSIS — J45.909 ASTHMA: ICD-10-CM

## 2023-11-01 DIAGNOSIS — F60.9 PERSONALITY DISORDER: ICD-10-CM

## 2023-11-01 DIAGNOSIS — M54.50 LUMBAGO: ICD-10-CM

## 2023-11-01 DIAGNOSIS — M54.50 LUMBAGO: Primary | ICD-10-CM

## 2023-11-01 PROCEDURE — 73552 X-RAY EXAM OF FEMUR 2/>: CPT | Mod: TC,LT

## 2023-11-01 PROCEDURE — 73552 XR FEMUR 2 VIEW LEFT: ICD-10-PCS | Mod: 26,LT,, | Performed by: RADIOLOGY

## 2023-11-01 PROCEDURE — 73552 X-RAY EXAM OF FEMUR 2/>: CPT | Mod: 26,LT,, | Performed by: RADIOLOGY

## 2023-11-03 DIAGNOSIS — M25.561 PAIN IN BOTH KNEES, UNSPECIFIED CHRONICITY: Primary | ICD-10-CM

## 2023-11-03 DIAGNOSIS — M25.562 PAIN IN BOTH KNEES, UNSPECIFIED CHRONICITY: Primary | ICD-10-CM

## 2024-02-17 ENCOUNTER — HOSPITAL ENCOUNTER (EMERGENCY)
Facility: HOSPITAL | Age: 69
Discharge: HOME | End: 2024-02-17
Attending: STUDENT IN AN ORGANIZED HEALTH CARE EDUCATION/TRAINING PROGRAM
Payer: MEDICARE

## 2024-02-17 VITALS
OXYGEN SATURATION: 96 % | SYSTOLIC BLOOD PRESSURE: 119 MMHG | HEART RATE: 102 BPM | WEIGHT: 179 LBS | TEMPERATURE: 98 F | HEIGHT: 62 IN | BODY MASS INDEX: 32.94 KG/M2 | RESPIRATION RATE: 16 BRPM | DIASTOLIC BLOOD PRESSURE: 75 MMHG

## 2024-02-17 DIAGNOSIS — R05.9 COUGH: ICD-10-CM

## 2024-02-17 DIAGNOSIS — N12 PYELONEPHRITIS: Primary | ICD-10-CM

## 2024-02-17 PROBLEM — C50.319 MALIGNANT NEOPLASM OF LOWER-INNER QUADRANT OF FEMALE BREAST: Status: ACTIVE | Noted: 2024-02-17

## 2024-02-17 LAB
BACTERIA #/AREA URNS HPF: ABNORMAL /HPF
BILIRUB UR QL STRIP: ABNORMAL
CLARITY UR: ABNORMAL
COLOR UR: YELLOW
GLUCOSE UR QL STRIP: NEGATIVE
HCV AB SERPL QL IA: NORMAL
HGB UR QL STRIP: ABNORMAL
HIV 1+2 AB+HIV1 P24 AG SERPL QL IA: NORMAL
HYALINE CASTS #/AREA URNS LPF: 0 /LPF
INFLUENZA A, MOLECULAR: NEGATIVE
INFLUENZA B, MOLECULAR: NEGATIVE
KETONES UR QL STRIP: NEGATIVE
LEUKOCYTE ESTERASE UR QL STRIP: ABNORMAL
MICROSCOPIC COMMENT: ABNORMAL
NITRITE UR QL STRIP: NEGATIVE
PH UR STRIP: 6 [PH] (ref 5–8)
PROT UR QL STRIP: ABNORMAL
RBC #/AREA URNS HPF: 6 /HPF (ref 0–4)
SARS-COV-2 RDRP RESP QL NAA+PROBE: NEGATIVE
SP GR UR STRIP: 1.01 (ref 1–1.03)
SPECIMEN SOURCE: NORMAL
SQUAMOUS #/AREA URNS HPF: 10 /HPF
URN SPEC COLLECT METH UR: ABNORMAL
UROBILINOGEN UR STRIP-ACNC: ABNORMAL EU/DL
WBC #/AREA URNS HPF: >100 /HPF (ref 0–5)

## 2024-02-17 PROCEDURE — 71045 X-RAY EXAM CHEST 1 VIEW: CPT | Mod: TC

## 2024-02-17 PROCEDURE — 81000 URINALYSIS NONAUTO W/SCOPE: CPT | Performed by: NURSE PRACTITIONER

## 2024-02-17 PROCEDURE — 87077 CULTURE AEROBIC IDENTIFY: CPT | Performed by: NURSE PRACTITIONER

## 2024-02-17 PROCEDURE — 87088 URINE BACTERIA CULTURE: CPT | Performed by: NURSE PRACTITIONER

## 2024-02-17 PROCEDURE — 87502 INFLUENZA DNA AMP PROBE: CPT | Performed by: STUDENT IN AN ORGANIZED HEALTH CARE EDUCATION/TRAINING PROGRAM

## 2024-02-17 PROCEDURE — 87389 HIV-1 AG W/HIV-1&-2 AB AG IA: CPT | Performed by: STUDENT IN AN ORGANIZED HEALTH CARE EDUCATION/TRAINING PROGRAM

## 2024-02-17 PROCEDURE — 99284 EMERGENCY DEPT VISIT MOD MDM: CPT | Mod: 25

## 2024-02-17 PROCEDURE — 36415 COLL VENOUS BLD VENIPUNCTURE: CPT | Performed by: STUDENT IN AN ORGANIZED HEALTH CARE EDUCATION/TRAINING PROGRAM

## 2024-02-17 PROCEDURE — 71045 X-RAY EXAM CHEST 1 VIEW: CPT | Mod: 26,,, | Performed by: RADIOLOGY

## 2024-02-17 PROCEDURE — 87086 URINE CULTURE/COLONY COUNT: CPT | Performed by: NURSE PRACTITIONER

## 2024-02-17 PROCEDURE — 86803 HEPATITIS C AB TEST: CPT | Performed by: STUDENT IN AN ORGANIZED HEALTH CARE EDUCATION/TRAINING PROGRAM

## 2024-02-17 PROCEDURE — 87186 SC STD MICRODIL/AGAR DIL: CPT | Performed by: NURSE PRACTITIONER

## 2024-02-17 PROCEDURE — U0002 COVID-19 LAB TEST NON-CDC: HCPCS | Performed by: STUDENT IN AN ORGANIZED HEALTH CARE EDUCATION/TRAINING PROGRAM

## 2024-02-17 RX ORDER — ONDANSETRON 4 MG/1
4 TABLET, FILM COATED ORAL EVERY 6 HOURS PRN
Qty: 12 TABLET | Refills: 0 | Status: SHIPPED | OUTPATIENT
Start: 2024-02-17

## 2024-02-17 RX ORDER — CIPROFLOXACIN 500 MG/1
500 TABLET ORAL DAILY
Qty: 7 TABLET | Refills: 0 | Status: SHIPPED | OUTPATIENT
Start: 2024-02-17 | End: 2024-02-24

## 2024-02-17 NOTE — ED PROVIDER NOTES
Encounter Date: 2024       History     Chief Complaint   Patient presents with    confusion/ flank pain      Presents with AMS and L flank pain onset 4 days.      68-year-old female with a history of hypertension, thyroid disease PTSD, COPD, arthritis, breast cancer. She presents to the ED with complaints of 4 day history of confusion as well as left flank pain. Patient also endorses 4 day history of dysuria, urine frequency. She also reports headache, dry cough, she tells me she was concerned for pneumonia. She denies associated fever, or chills.    The history is provided by the patient. No  was used.     Review of patient's allergies indicates:   Allergen Reactions    Sulfa (sulfonamide antibiotics)      Other reaction(s): Unknown     Past Medical History:   Diagnosis Date    Arthritis     Breast cancer     COPD (chronic obstructive pulmonary disease)     Depression     Herpes simplex virus (HSV) infection     Hypertension     PTSD (post-traumatic stress disorder)     Thyroid disease      Past Surgical History:   Procedure Laterality Date     SECTION      DILATION AND CURETTAGE OF UTERUS  10/04/2022    HYSTEROSCOPY W/ POLYPECTOMY  10/04/2022    MASTECTOMY      OOPHORECTOMY Left     age 21    SALPINGECTOMY       Family History   Problem Relation Age of Onset    Hypertension Father     Hypertension Mother     Breast cancer Neg Hx     Ovarian cancer Neg Hx      Social History     Tobacco Use    Smoking status: Former    Smokeless tobacco: Never   Substance Use Topics    Alcohol use: Not Currently    Drug use: Not Currently     Review of Systems   Constitutional: Negative.    HENT: Negative.     Eyes: Negative.    Respiratory: Negative.     Cardiovascular: Negative.    Gastrointestinal:  Positive for abdominal pain (Left flank).   Endocrine: Negative.    Genitourinary:  Positive for dysuria, flank pain and frequency.   Neurological: Negative.    Hematological: Negative.     Psychiatric/Behavioral: Negative.     All other systems reviewed and are negative.      Physical Exam     Initial Vitals   BP Pulse Resp Temp SpO2   02/17/24 1343 02/17/24 1343 02/17/24 1343 02/17/24 1409 02/17/24 1343   126/81 110 16 97.9 °F (36.6 °C) 95 %      MAP       --                Physical Exam    Nursing note and vitals reviewed.  Constitutional: She appears well-developed.   HENT:   Head: Normocephalic.   Eyes: Pupils are equal, round, and reactive to light.   Neck:   Normal range of motion.  Cardiovascular:  Normal rate.           Pulmonary/Chest: Breath sounds normal. No respiratory distress.   Abdominal: Abdomen is soft. There is abdominal tenderness (CVA tenderness left).   Musculoskeletal:      Cervical back: Normal range of motion.     Neurological: She is alert and oriented to person, place, and time. GCS score is 15. GCS eye subscore is 4. GCS verbal subscore is 5. GCS motor subscore is 6.   Skin: Skin is warm. Capillary refill takes less than 2 seconds.   Psychiatric: She has a normal mood and affect.         ED Course   Procedures  Labs Reviewed   URINALYSIS, REFLEX TO URINE CULTURE - Abnormal; Notable for the following components:       Result Value    Appearance, UA Hazy (*)     Protein, UA 2+ (*)     Bilirubin (UA) 1+ (*)     Occult Blood UA 1+ (*)     Urobilinogen, UA 2.0-3.0 (*)     Leukocytes, UA 3+ (*)     All other components within normal limits    Narrative:     Preferred Collection Type->Urine, Clean Catch  Specimen Source->Urine   URINALYSIS MICROSCOPIC - Abnormal; Notable for the following components:    RBC, UA 6 (*)     WBC, UA >100 (*)     Bacteria Many (*)     All other components within normal limits    Narrative:     Preferred Collection Type->Urine, Clean Catch  Specimen Source->Urine   INFLUENZA A & B BY MOLECULAR   CULTURE, URINE   SARS-COV-2 RNA AMPLIFICATION, QUAL    Narrative:     Is the patient symptomatic?->Yes   HIV 1 / 2 ANTIBODY   HEPATITIS C ANTIBODY           Imaging Results              X-Ray Chest AP Portable (Final result)  Result time 02/17/24 15:25:46      Final result by Darshana Jang MD (02/17/24 15:25:46)                   Impression:      No acute cardiopulmonary abnormality.      Electronically signed by: Darshana Jang  Date:    02/17/2024  Time:    15:25               Narrative:    EXAMINATION:  XR CHEST AP PORTABLE    CLINICAL HISTORY:  Cough, unspecified    TECHNIQUE:  Single view of the chest was obtained.    COMPARISON:  Multiple priors, most recent 06/07/2022    FINDINGS:  Normal cardiomediastinal contour. No focal consolidation, pleural effusion or pneumothorax.                                       Medications - No data to display  Medical Decision Making  68-year-old female with cough, dysuria, CVA tenderness, flank pain.  Differential includes UTI, pyelonephritis, others  Patient is afebrile well-appearing alert oriented x3  UA shows positive leukocyte esterase, negative nitrites, pyuria, suggestive of UTI  COVID negative, flu negative, chest x-ray showed no acute findings  Rx Cipro for likely pyelonephritis  Patient was seen and reevaluated. Patient's symptoms seem to be stable. I discussed the patient's diagnosis, treatment plan, and plan for discharge with the patient. Patient was instructed to follow up with PCP and was given strict return precautions to the ED. The patient voiced understanding and agreed with the plan      Amount and/or Complexity of Data Reviewed  Labs: ordered.  Radiology: ordered.                                      Clinical Impression:  Final diagnoses:  [R05.9] Cough  [N12] Pyelonephritis (Primary)                 Matt Fuentes MD  02/17/24 0666

## 2024-02-20 LAB — BACTERIA UR CULT: ABNORMAL

## 2024-05-24 ENCOUNTER — HOSPITAL ENCOUNTER (OUTPATIENT)
Dept: RADIOLOGY | Facility: HOSPITAL | Age: 69
Discharge: HOME | End: 2024-05-24
Attending: STUDENT IN AN ORGANIZED HEALTH CARE EDUCATION/TRAINING PROGRAM
Payer: MEDICARE

## 2024-05-24 DIAGNOSIS — S80.12XA CONTUSION OF LEFT LOWER LEG, INITIAL ENCOUNTER: ICD-10-CM

## 2024-05-24 PROCEDURE — 76882 US LMTD JT/FCL EVL NVASC XTR: CPT | Mod: TC,LT

## 2024-05-24 PROCEDURE — 76882 US LMTD JT/FCL EVL NVASC XTR: CPT | Mod: 26,LT,, | Performed by: RADIOLOGY

## 2024-07-25 DIAGNOSIS — M48.02 SPINAL STENOSIS OF CERVICAL REGION: Primary | ICD-10-CM

## 2024-08-05 ENCOUNTER — CLINICAL SUPPORT (OUTPATIENT)
Dept: REHABILITATION | Facility: HOSPITAL | Age: 69
End: 2024-08-05
Payer: MEDICARE

## 2024-08-05 DIAGNOSIS — M54.2 CERVICAL PAIN: Primary | ICD-10-CM

## 2024-08-05 DIAGNOSIS — M48.02 SPINAL STENOSIS OF CERVICAL REGION: ICD-10-CM

## 2024-08-05 PROCEDURE — 97110 THERAPEUTIC EXERCISES: CPT

## 2024-08-05 PROCEDURE — 97161 PT EVAL LOW COMPLEX 20 MIN: CPT

## 2024-08-13 ENCOUNTER — CLINICAL SUPPORT (OUTPATIENT)
Dept: REHABILITATION | Facility: HOSPITAL | Age: 69
End: 2024-08-13
Payer: MEDICARE

## 2024-08-13 DIAGNOSIS — M54.2 CERVICAL PAIN: Primary | ICD-10-CM

## 2024-08-13 PROCEDURE — 97110 THERAPEUTIC EXERCISES: CPT

## 2024-08-13 NOTE — PROGRESS NOTES
[facility]   Physical Therapy Treatment Note     Name: Hilda Cheek  Clinic Number: [id number]    Therapy Diagnosis:   Encounter Diagnosis   Name Primary?    Cervical pain Yes     Physician: Mercedes Del Rio NP    Visit Date: 8/13/2024      Time In: 9:30  Time Out: 10:10  Total Billable Time: 40 minutes    SUBJECTIVE     Pt reports: I'm doing ok today, my neck has been cracking a lot.  She was compliant with home exercise program.      Pain: 0/10  Location: bilateral neck      OBJECTIVE          Treatment     Hilda received the treatments listed below:    therapeutic exercises to develop strength, endurance, ROM, flexibility, and posture for 40 minutes including:  UBE   x 10 mins  CROM  x 10  Turtles  x 10  Scap elevate  x 20  Scap retract  x 20  Shldr flex  x 20  Shldr abd  x 20  Shldr IR  x 20  Shldr ER  x 20  Rows   x 20  UT stretch  x 3  Lev scap stretch x 3            Patient Education and Home Exercises     Home Exercises Provided and Patient Education Provided     Education provided:   - CROM    Written Home Exercises Provided: Patient instructed to cont prior HEP. Exercises were reviewed and Hilda was able to demonstrate them prior to the end of the session.  Hilda demonstrated good  understanding of the education provided. See EMR under Patient Instructions for exercises provided during therapy sessions    ASSESSMENT         Hilda Is progressing well towards her goals.   Pt prognosis is Good.     Pt will continue to benefit from skilled outpatient physical therapy to address the deficits listed in the problem list box on initial evaluation, provide pt/family education and to maximize pt's level of independence in the home and community environment.     Pt's spiritual, cultural and educational needs considered and pt agreeable to plan of care and goals.     Anticipated barriers to physical therapy: 0    Goals:   Short Term GOALS: 3 weeks. Pt agrees with goals  set.  1. Patient demonstrates independence with HEP.   2. Patient demonstrates independence with Postural Awareness.   3. Patient demonstrates independence with body mechanics.      Long Term GOALS: 6 weeks. Pt agrees with goals set.  1. Patient demonstrates increased CROM to improve tolerance to functional activities.   2. Patient demonstrates increased strength BUE's to 5/5 or greater to improve tolerance to functional activities.   3. Patient demonstrates improved overall function per NDI to 10% or less.     PLAN     POC: continue PT 2 x a week for 4 weeks progressing towards goals    7 scheduled visits remaining - August 15, 21, 26, 28, September 4 , 6, 11  The patient is to be progressed within the established plan of care as tolerated in order to accomplish goals as stated above.   Sravan Hermosillo, PT

## 2024-08-15 ENCOUNTER — CLINICAL SUPPORT (OUTPATIENT)
Dept: REHABILITATION | Facility: HOSPITAL | Age: 69
End: 2024-08-15
Payer: MEDICARE

## 2024-08-15 DIAGNOSIS — M54.2 CERVICAL PAIN: Primary | ICD-10-CM

## 2024-08-15 PROCEDURE — 97110 THERAPEUTIC EXERCISES: CPT

## 2024-08-15 NOTE — PROGRESS NOTES
[facility]   Physical Therapy Treatment Note     Name: Hilda Cheek  Clinic Number: [id number]    Therapy Diagnosis:   Encounter Diagnosis   Name Primary?    Cervical pain Yes     Physician: Mercedes Del Rio NP    Visit Date: 8/15/2024      Time In: 9:00  Time Out: 9:40  Total Billable Time: 40 minutes    SUBJECTIVE     Pt reports: I get my shot tomorrow, my neck is sore today  She was compliant with home exercise program.      Pain: 3/10  Location: bilateral neck      OBJECTIVE          Treatment     Hilda received the treatments listed below:    therapeutic exercises to develop strength, endurance, ROM, flexibility, and posture for 40 minutes including:  UBE   x 10 mins  CROM  x 10  Turtles  x 10  Scap elevate  x 20  Scap retract  x 20  Shldr flex  x 20  Shldr abd  x 20  Shldr IR  x 20  Shldr ER  x 20  Rows   x 20  UT stretch  x 3  Lev scap stretch x 3            Patient Education and Home Exercises     Home Exercises Provided and Patient Education Provided     Education provided:   - CROM    Written Home Exercises Provided: Patient instructed to cont prior HEP. Exercises were reviewed and Hilda was able to demonstrate them prior to the end of the session.  Hilda demonstrated good  understanding of the education provided. See EMR under Patient Instructions for exercises provided during therapy sessions    ASSESSMENT         Hilda Is progressing well towards her goals.   Pt prognosis is Good.     Pt will continue to benefit from skilled outpatient physical therapy to address the deficits listed in the problem list box on initial evaluation, provide pt/family education and to maximize pt's level of independence in the home and community environment.     Pt's spiritual, cultural and educational needs considered and pt agreeable to plan of care and goals.     Anticipated barriers to physical therapy: 0    Goals:   Short Term GOALS: 3 weeks. Pt agrees with goals set.  1.  Patient demonstrates independence with HEP.   2. Patient demonstrates independence with Postural Awareness.   3. Patient demonstrates independence with body mechanics.      Long Term GOALS: 6 weeks. Pt agrees with goals set.  1. Patient demonstrates increased CROM to improve tolerance to functional activities.   2. Patient demonstrates increased strength BUE's to 5/5 or greater to improve tolerance to functional activities.   3. Patient demonstrates improved overall function per NDI to 10% or less.     PLAN     POC: continue PT 2 x a week for 4 weeks progressing towards goals    The patient is to be progressed within the established plan of care as tolerated in order to accomplish goals as stated above.   6 scheduled visits remaining - August 21, 26, 28, September 4 , 6, 11  Sravan Hermosillo, PT

## 2025-02-26 ENCOUNTER — HOSPITAL ENCOUNTER (OUTPATIENT)
Dept: RADIOLOGY | Facility: HOSPITAL | Age: 70
Discharge: HOME | End: 2025-02-26
Attending: NURSE PRACTITIONER
Payer: MEDICARE

## 2025-02-26 DIAGNOSIS — R06.2 WHEEZING: Primary | ICD-10-CM

## 2025-02-26 DIAGNOSIS — R06.2 WHEEZING: ICD-10-CM

## 2025-02-26 PROCEDURE — 71046 X-RAY EXAM CHEST 2 VIEWS: CPT | Mod: 26,,, | Performed by: RADIOLOGY

## 2025-02-26 PROCEDURE — 71046 X-RAY EXAM CHEST 2 VIEWS: CPT | Mod: TC

## 2025-04-16 DIAGNOSIS — M79.671 PAIN IN RIGHT FOOT: Primary | ICD-10-CM

## 2025-04-28 ENCOUNTER — OFFICE VISIT (OUTPATIENT)
Dept: PODIATRY | Facility: CLINIC | Age: 70
End: 2025-04-28
Payer: MEDICARE

## 2025-04-28 ENCOUNTER — HOSPITAL ENCOUNTER (OUTPATIENT)
Dept: RADIOLOGY | Facility: HOSPITAL | Age: 70
Discharge: HOME | End: 2025-04-28
Attending: PODIATRIST
Payer: MEDICARE

## 2025-04-28 VITALS
HEART RATE: 61 BPM | DIASTOLIC BLOOD PRESSURE: 89 MMHG | SYSTOLIC BLOOD PRESSURE: 171 MMHG | WEIGHT: 179 LBS | HEIGHT: 62 IN | BODY MASS INDEX: 32.94 KG/M2

## 2025-04-28 DIAGNOSIS — L97.511 ULCER OF RIGHT FOOT, LIMITED TO BREAKDOWN OF SKIN: Primary | ICD-10-CM

## 2025-04-28 DIAGNOSIS — M20.41 HAMMER TOE OF RIGHT FOOT: ICD-10-CM

## 2025-04-28 DIAGNOSIS — M79.671 PAIN IN RIGHT FOOT: ICD-10-CM

## 2025-04-28 PROCEDURE — 1159F MED LIST DOCD IN RCRD: CPT | Mod: CPTII,S$GLB,, | Performed by: PODIATRIST

## 2025-04-28 PROCEDURE — 99999 PR PBB SHADOW E&M-EST. PATIENT-LVL V: CPT | Mod: PBBFAC,,, | Performed by: PODIATRIST

## 2025-04-28 PROCEDURE — 3008F BODY MASS INDEX DOCD: CPT | Mod: CPTII,S$GLB,, | Performed by: PODIATRIST

## 2025-04-28 PROCEDURE — 1125F AMNT PAIN NOTED PAIN PRSNT: CPT | Mod: CPTII,S$GLB,, | Performed by: PODIATRIST

## 2025-04-28 PROCEDURE — 99203 OFFICE O/P NEW LOW 30 MIN: CPT | Mod: S$GLB,,, | Performed by: PODIATRIST

## 2025-04-28 PROCEDURE — 3288F FALL RISK ASSESSMENT DOCD: CPT | Mod: CPTII,S$GLB,, | Performed by: PODIATRIST

## 2025-04-28 PROCEDURE — 73630 X-RAY EXAM OF FOOT: CPT | Mod: TC,RT

## 2025-04-28 PROCEDURE — 73630 X-RAY EXAM OF FOOT: CPT | Mod: 26,RT,, | Performed by: RADIOLOGY

## 2025-04-28 PROCEDURE — 3077F SYST BP >= 140 MM HG: CPT | Mod: CPTII,S$GLB,, | Performed by: PODIATRIST

## 2025-04-28 PROCEDURE — 1101F PT FALLS ASSESS-DOCD LE1/YR: CPT | Mod: CPTII,S$GLB,, | Performed by: PODIATRIST

## 2025-04-28 PROCEDURE — 1160F RVW MEDS BY RX/DR IN RCRD: CPT | Mod: CPTII,S$GLB,, | Performed by: PODIATRIST

## 2025-04-28 PROCEDURE — 3079F DIAST BP 80-89 MM HG: CPT | Mod: CPTII,S$GLB,, | Performed by: PODIATRIST

## 2025-04-28 RX ORDER — PREDNISONE 20 MG/1
TABLET ORAL
COMMUNITY
Start: 2025-04-23

## 2025-04-28 RX ORDER — PROMETHAZINE HYDROCHLORIDE AND DEXTROMETHORPHAN HYDROBROMIDE 6.25; 15 MG/5ML; MG/5ML
5 SYRUP ORAL EVERY 6 HOURS PRN
COMMUNITY
Start: 2025-02-26

## 2025-04-28 RX ORDER — HYDROCODONE BITARTRATE AND ACETAMINOPHEN 10; 325 MG/1; MG/1
TABLET ORAL
COMMUNITY
Start: 2024-05-21

## 2025-04-28 RX ORDER — LORATADINE 10 MG/1
10 TABLET ORAL DAILY PRN
COMMUNITY
Start: 2025-04-15

## 2025-04-29 NOTE — PROGRESS NOTES
Subjective:       Patient ID: Hilda Cheek is a 69 y.o. female.    Chief Complaint: Foot Pain  Patient presents for a new patient evaluation she is complaining about a painful 5th digit right foot.  Patient was seen for similar problem over 3 years ago.    Past Medical History:   Diagnosis Date    Arthritis     Breast cancer     COPD (chronic obstructive pulmonary disease)     Depression     Herpes simplex virus (HSV) infection     Hypertension     PTSD (post-traumatic stress disorder)     Thyroid disease      Past Surgical History:   Procedure Laterality Date     SECTION      DILATION AND CURETTAGE OF UTERUS  10/04/2022    HYSTEROSCOPY W/ POLYPECTOMY  10/04/2022    MASTECTOMY      OOPHORECTOMY Left     age 21    SALPINGECTOMY       Family History   Problem Relation Name Age of Onset    Hypertension Father      Hypertension Mother      Breast cancer Neg Hx      Ovarian cancer Neg Hx       Social History     Socioeconomic History    Marital status:    Tobacco Use    Smoking status: Former    Smokeless tobacco: Never   Substance and Sexual Activity    Alcohol use: Not Currently    Drug use: Not Currently    Sexual activity: Yes     Partners: Male     Birth control/protection: Post-menopausal       Current Outpatient Medications   Medication Sig Dispense Refill    albuterol (PROVENTIL/VENTOLIN HFA) 90 mcg/actuation inhaler inhale 2 puff by inhalation route  every 4 - 6 hours as needed as needed      anastrozole (ARIMIDEX) 1 mg Tab Take 1 mg by mouth as needed.      calcium carbonate (OS-KAYE) 500 mg calcium (1,250 mg) tablet       cariprazine (VRAYLAR) 1.5 mg Cap Take 1 capsule by mouth.      diazePAM (VALIUM) 10 MG Tab Take by mouth.      diclofenac sodium (VOLTAREN) 1 % Gel Apply topically.      dicyclomine (BENTYL) 20 mg tablet dicyclomine 20 mg tablet      dorzolamide (TRUSOPT) 2 % ophthalmic solution INSTILL 1 DROPS INTO EACH EYE TWICE DAILY      DULoxetine (CYMBALTA) 60 MG capsule Take 60 mg by  "mouth every morning.      fluconazole (DIFLUCAN) 150 MG Tab Take one tablet once, take additional tablets as directed by provider. 3 tablet 0    fluticasone-salmeterol diskus inhaler 250-50 mcg Inhale 1 puff into the lungs.      HYDROcodone-acetaminophen (NORCO)  mg per tablet 60 EA, 0 Refill(s), TAKE 1 TABLET BY MOUTH TWICE DAILY AS NEEDED FOR PAIN, 0 Refill(s)      hydrOXYzine HCL (ATARAX) 25 MG tablet Take 2 tablets (50 mg total) by mouth every 6 (six) hours. 12 tablet 0    latanoprost 0.005 % ophthalmic solution INSTILL 1 DROP INTO EACH EYE AT BEDTIME      levothyroxine (SYNTHROID) 75 MCG tablet Take by mouth.      loratadine (CLARITIN) 10 mg tablet Take 10 mg by mouth daily as needed.      meloxicam (MOBIC) 15 MG tablet Take 1 tablet (15 mg total) by mouth once daily. **NO REFILLS**MUST SCHEDULE ANNUAL EXAM** 30 tablet 0    metoprolol tartrate (LOPRESSOR) 25 MG tablet Take 25 mg by mouth 2 (two) times daily.      nystatin (MYCOSTATIN) cream Apply topically 2 (two) times daily.      omeprazole (PRILOSEC) 40 MG capsule Take 1 capsule by mouth.      ondansetron (ZOFRAN) 4 MG tablet Take 1 tablet (4 mg total) by mouth every 6 (six) hours as needed for Nausea. 12 tablet 0    predniSONE (DELTASONE) 20 MG tablet Take by mouth.      promethazine-dextromethorphan (PROMETHAZINE-DM) 6.25-15 mg/5 mL Syrp Take 5 mLs by mouth every 6 (six) hours as needed.       No current facility-administered medications for this visit.     Review of patient's allergies indicates:   Allergen Reactions    Latex Swelling    Sulfa (sulfonamide antibiotics)      Other reaction(s): Unknown       Review of Systems   Musculoskeletal:  Positive for arthralgias.   Skin:  Positive for color change and wound.   All other systems reviewed and are negative.      Objective:      Vitals:    04/28/25 0934   BP: (!) 171/89   Pulse: 61   Weight: 81.2 kg (179 lb 0.2 oz)   Height: 5' 2" (1.575 m)     Physical Exam  Vitals and nursing note reviewed. "   Constitutional:       Appearance: Normal appearance.   Cardiovascular:      Pulses:           Dorsalis pedis pulses are 2+ on the right side and 2+ on the left side.        Posterior tibial pulses are 1+ on the right side and 1+ on the left side.   Pulmonary:      Effort: Pulmonary effort is normal.   Musculoskeletal:         General: Swelling, tenderness and signs of injury present.      Right foot: Deformity present.      Left foot: Deformity present.   Feet:      Right foot:      Protective Sensation: 2 sites tested.  2 sites sensed.      Skin integrity: Ulcer, skin breakdown and erythema present.      Toenail Condition: Right toenails are ingrown. Fungal disease present.     Left foot:      Protective Sensation: 2 sites tested.  2 sites sensed.      Skin integrity: Ulcer, skin breakdown and erythema present.      Toenail Condition: Fungal disease present.  Skin:     Capillary Refill: Capillary refill takes 2 to 3 seconds.      Findings: Erythema present.   Neurological:      General: No focal deficit present.      Mental Status: She is alert.   Psychiatric:         Mood and Affect: Mood normal.         Behavior: Behavior normal.         Thought Content: Thought content normal.         Judgment: Judgment normal.                                    Assessment:       1. Ulcer of right foot, limited to breakdown of skin    2. Hammer toe of right foot    3. Pain in right foot        Plan:       Patient presents for a new patient evaluation she is complaining about a painful 5th digit right foot.  Patient was seen for similar problem over 3 years ago.  Comprehensive new patient evaluation was performed.  Patient has an area of pre ulceration along the medial aspect of the 5th digit right foot in the 4th webspace.  Patient has had a problem with this area previously the patient had a fungal infection ulceration of the area patient states closed and has not ulcerated again since I saw her last but it does bother her  from time to time she states she 1st started noticing it bothering her about 6 months ago.  X-rays were taken today there is no significant change on x-rays taken today from x-rays taken 3 years ago the patient does have some internal rotation with medial deviation of the 5th digit which is causing rubbing between the 4th and 5th digits.  I did advised the patient the area looks good it is not infected there is prominent bone that is noted upon palpation of the area which could certainly lead to further breakdown ulceration and infection.  I did dispensed the patient several different types of toe spacers some that were relatively new that were not available 3 years ago the slide both over the 5th and 4th digit providing a spacer in the 4th webspace I have recommended the patient try both type see what works the best she only really wear shoes when she goes out of the house but the shoe she is wearing her very narrow and she certainly needs to wear wider shoes she is not having any pain when she is in the house wearing slippers or loose fitting shoes only was shoes that she wears outside the house.  Patient needs to avoid the excessively narrow shoes and needs to use the toe spacers as recommended recommended follow-up as needed if the patient has any problems questions concerns or is not getting relief from the irritation she is to contact us immediately.  X-rays reviewed in detail patient discussed comparison from previous x-ray to the x-rays taken today.  New patient evaluation performed as it has been over 3 years since the patient was seen last.  This note was created using [employer] voice recognition software that occasionally misinterpreted phrases or words.

## 2025-07-08 ENCOUNTER — HOSPITAL ENCOUNTER (INPATIENT)
Facility: HOSPITAL | Age: 70
LOS: 1 days | Discharge: HOME | DRG: 189 | End: 2025-07-10
Attending: STUDENT IN AN ORGANIZED HEALTH CARE EDUCATION/TRAINING PROGRAM | Admitting: INTERNAL MEDICINE
Payer: MEDICARE

## 2025-07-08 DIAGNOSIS — R06.02 SHORTNESS OF BREATH: ICD-10-CM

## 2025-07-08 DIAGNOSIS — J44.1 COPD EXACERBATION: Primary | ICD-10-CM

## 2025-07-08 PROBLEM — I10 HTN (HYPERTENSION): Status: ACTIVE | Noted: 2025-07-08

## 2025-07-08 PROBLEM — E03.9 HYPOTHYROIDISM: Status: ACTIVE | Noted: 2025-07-08

## 2025-07-08 PROBLEM — J96.01 ACUTE HYPOXIC RESPIRATORY FAILURE: Status: ACTIVE | Noted: 2025-07-08

## 2025-07-08 LAB
ABSOLUTE EOSINOPHIL (OHS): 1.56 K/UL
ABSOLUTE MONOCYTE (OHS): 0.84 K/UL (ref 0.3–1)
ABSOLUTE NEUTROPHIL COUNT (OHS): 3.85 K/UL (ref 1.8–7.7)
ALBUMIN SERPL BCP-MCNC: 4 G/DL (ref 3.5–5.2)
ALLENS TEST: ABNORMAL
ALP SERPL-CCNC: 94 UNIT/L (ref 40–150)
ALT SERPL W/O P-5'-P-CCNC: 21 UNIT/L (ref 10–44)
ANION GAP (OHS): 11 MMOL/L (ref 8–16)
AST SERPL-CCNC: 27 UNIT/L (ref 11–45)
BASOPHILS # BLD AUTO: 0.12 K/UL
BASOPHILS NFR BLD AUTO: 1.1 %
BILIRUB SERPL-MCNC: 0.4 MG/DL (ref 0.1–1)
BNP SERPL-MCNC: 17 PG/ML (ref 0–99)
BUN SERPL-MCNC: 7 MG/DL (ref 8–23)
CALCIUM SERPL-MCNC: 9.5 MG/DL (ref 8.7–10.5)
CHLORIDE SERPL-SCNC: 110 MMOL/L (ref 95–110)
CO2 SERPL-SCNC: 20 MMOL/L (ref 23–29)
CREAT SERPL-MCNC: 1.1 MG/DL (ref 0.5–1.4)
D DIMER PPP IA.FEU-MCNC: 0.34 MG/L FEU
DELSYS: ABNORMAL
EP: 7
ERYTHROCYTE [DISTWIDTH] IN BLOOD BY AUTOMATED COUNT: 12.6 % (ref 11.5–14.5)
FIO2: 40
GFR SERPLBLD CREATININE-BSD FMLA CKD-EPI: 55 ML/MIN/1.73/M2
GLUCOSE SERPL-MCNC: 109 MG/DL (ref 70–110)
HCO3 UR-SCNC: 19 MMOL/L (ref 24–28)
HCT VFR BLD AUTO: 43.3 % (ref 37–48.5)
HGB BLD-MCNC: 14.4 GM/DL (ref 12–16)
HOLD SPECIMEN: NORMAL
IMM GRANULOCYTES # BLD AUTO: 0.03 K/UL (ref 0–0.04)
IMM GRANULOCYTES NFR BLD AUTO: 0.3 % (ref 0–0.5)
IP: 14
LYMPHOCYTES # BLD AUTO: 4.86 K/UL (ref 1–4.8)
MAGNESIUM SERPL-MCNC: 1.9 MG/DL (ref 1.6–2.6)
MCH RBC QN AUTO: 29.4 PG (ref 27–31)
MCHC RBC AUTO-ENTMCNC: 33.3 G/DL (ref 32–36)
MCV RBC AUTO: 88 FL (ref 82–98)
MODE: ABNORMAL
NUCLEATED RBC (/100WBC) (OHS): 0 /100 WBC
OHS QRS DURATION: 68 MS
OHS QRS DURATION: 70 MS
OHS QTC CALCULATION: 433 MS
OHS QTC CALCULATION: 434 MS
PCO2 BLDA: 29.5 MMHG (ref 35–45)
PH SMN: 7.42 [PH] (ref 7.35–7.45)
PLATELET # BLD AUTO: 324 K/UL (ref 150–450)
PMV BLD AUTO: 10.2 FL (ref 9.2–12.9)
PO2 BLDA: 70 MMHG (ref 80–100)
POC BE: -6 MMOL/L (ref -2–2)
POC SATURATED O2: 94 % (ref 95–100)
POC TCO2: 20 MMOL/L (ref 23–27)
POTASSIUM SERPL-SCNC: 3.9 MMOL/L (ref 3.5–5.1)
PROCALCITONIN SERPL-MCNC: 0.02 NG/ML
PROT SERPL-MCNC: 7.8 GM/DL (ref 6–8.4)
RBC # BLD AUTO: 4.9 M/UL (ref 4–5.4)
RELATIVE EOSINOPHIL (OHS): 13.9 %
RELATIVE LYMPHOCYTE (OHS): 43.2 % (ref 18–48)
RELATIVE MONOCYTE (OHS): 7.5 % (ref 4–15)
RELATIVE NEUTROPHIL (OHS): 34 % (ref 38–73)
SAMPLE: ABNORMAL
SARS-COV-2 RDRP RESP QL NAA+PROBE: NEGATIVE
SITE: ABNORMAL
SODIUM SERPL-SCNC: 141 MMOL/L (ref 136–145)
TROPONIN I SERPL DL<=0.01 NG/ML-MCNC: 0.01 NG/ML
WBC # BLD AUTO: 11.26 K/UL (ref 3.9–12.7)

## 2025-07-08 PROCEDURE — G0378 HOSPITAL OBSERVATION PER HR: HCPCS

## 2025-07-08 PROCEDURE — 84145 PROCALCITONIN (PCT): CPT | Performed by: NURSE PRACTITIONER

## 2025-07-08 PROCEDURE — 94660 CPAP INITIATION&MGMT: CPT

## 2025-07-08 PROCEDURE — 27000190 HC CPAP FULL FACE MASK W/VALVE

## 2025-07-08 PROCEDURE — 93010 ELECTROCARDIOGRAM REPORT: CPT | Mod: ,,, | Performed by: INTERNAL MEDICINE

## 2025-07-08 PROCEDURE — 71045 X-RAY EXAM CHEST 1 VIEW: CPT | Mod: 26,,, | Performed by: RADIOLOGY

## 2025-07-08 PROCEDURE — 99900035 HC TECH TIME PER 15 MIN (STAT)

## 2025-07-08 PROCEDURE — 25000003 PHARM REV CODE 250: Performed by: NURSE PRACTITIONER

## 2025-07-08 PROCEDURE — 94799 UNLISTED PULMONARY SVC/PX: CPT

## 2025-07-08 PROCEDURE — 83735 ASSAY OF MAGNESIUM: CPT | Performed by: STUDENT IN AN ORGANIZED HEALTH CARE EDUCATION/TRAINING PROGRAM

## 2025-07-08 PROCEDURE — 94640 AIRWAY INHALATION TREATMENT: CPT | Mod: XB

## 2025-07-08 PROCEDURE — 84484 ASSAY OF TROPONIN QUANT: CPT | Performed by: STUDENT IN AN ORGANIZED HEALTH CARE EDUCATION/TRAINING PROGRAM

## 2025-07-08 PROCEDURE — 94761 N-INVAS EAR/PLS OXIMETRY MLT: CPT

## 2025-07-08 PROCEDURE — 93005 ELECTROCARDIOGRAM TRACING: CPT | Performed by: INTERNAL MEDICINE

## 2025-07-08 PROCEDURE — 85025 COMPLETE CBC W/AUTO DIFF WBC: CPT | Performed by: STUDENT IN AN ORGANIZED HEALTH CARE EDUCATION/TRAINING PROGRAM

## 2025-07-08 PROCEDURE — 94640 AIRWAY INHALATION TREATMENT: CPT

## 2025-07-08 PROCEDURE — 96365 THER/PROPH/DIAG IV INF INIT: CPT

## 2025-07-08 PROCEDURE — 80053 COMPREHEN METABOLIC PANEL: CPT | Performed by: STUDENT IN AN ORGANIZED HEALTH CARE EDUCATION/TRAINING PROGRAM

## 2025-07-08 PROCEDURE — 99900031 HC PATIENT EDUCATION (STAT)

## 2025-07-08 PROCEDURE — 96375 TX/PRO/DX INJ NEW DRUG ADDON: CPT

## 2025-07-08 PROCEDURE — 99291 CRITICAL CARE FIRST HOUR: CPT

## 2025-07-08 PROCEDURE — 99234 HOSP IP/OBS SM DT SF/LOW 45: CPT | Mod: ,,, | Performed by: NURSE PRACTITIONER

## 2025-07-08 PROCEDURE — 63600175 PHARM REV CODE 636 W HCPCS: Mod: JZ,TB,UD | Performed by: STUDENT IN AN ORGANIZED HEALTH CARE EDUCATION/TRAINING PROGRAM

## 2025-07-08 PROCEDURE — 82803 BLOOD GASES ANY COMBINATION: CPT

## 2025-07-08 PROCEDURE — 27100171 HC OXYGEN HIGH FLOW UP TO 24 HOURS

## 2025-07-08 PROCEDURE — 63600175 PHARM REV CODE 636 W HCPCS: Performed by: NURSE PRACTITIONER

## 2025-07-08 PROCEDURE — 25000003 PHARM REV CODE 250: Performed by: HOSPITALIST

## 2025-07-08 PROCEDURE — 96372 THER/PROPH/DIAG INJ SC/IM: CPT | Performed by: NURSE PRACTITIONER

## 2025-07-08 PROCEDURE — 5A09357 ASSISTANCE WITH RESPIRATORY VENTILATION, LESS THAN 24 CONSECUTIVE HOURS, CONTINUOUS POSITIVE AIRWAY PRESSURE: ICD-10-PCS | Performed by: STUDENT IN AN ORGANIZED HEALTH CARE EDUCATION/TRAINING PROGRAM

## 2025-07-08 PROCEDURE — U0002 COVID-19 LAB TEST NON-CDC: HCPCS | Performed by: STUDENT IN AN ORGANIZED HEALTH CARE EDUCATION/TRAINING PROGRAM

## 2025-07-08 PROCEDURE — 85379 FIBRIN DEGRADATION QUANT: CPT | Performed by: NURSE PRACTITIONER

## 2025-07-08 PROCEDURE — 96366 THER/PROPH/DIAG IV INF ADDON: CPT

## 2025-07-08 PROCEDURE — 25000242 PHARM REV CODE 250 ALT 637 W/ HCPCS: Performed by: STUDENT IN AN ORGANIZED HEALTH CARE EDUCATION/TRAINING PROGRAM

## 2025-07-08 PROCEDURE — 36600 WITHDRAWAL OF ARTERIAL BLOOD: CPT

## 2025-07-08 PROCEDURE — 83880 ASSAY OF NATRIURETIC PEPTIDE: CPT | Performed by: STUDENT IN AN ORGANIZED HEALTH CARE EDUCATION/TRAINING PROGRAM

## 2025-07-08 PROCEDURE — 36415 COLL VENOUS BLD VENIPUNCTURE: CPT | Performed by: NURSE PRACTITIONER

## 2025-07-08 PROCEDURE — 25000003 PHARM REV CODE 250: Performed by: EMERGENCY MEDICINE

## 2025-07-08 PROCEDURE — 25000242 PHARM REV CODE 250 ALT 637 W/ HCPCS: Performed by: NURSE PRACTITIONER

## 2025-07-08 PROCEDURE — 71045 X-RAY EXAM CHEST 1 VIEW: CPT | Mod: TC

## 2025-07-08 PROCEDURE — 87070 CULTURE OTHR SPECIMN AEROBIC: CPT | Performed by: NURSE PRACTITIONER

## 2025-07-08 RX ORDER — HYDRALAZINE HYDROCHLORIDE 25 MG/1
25 TABLET, FILM COATED ORAL EVERY 8 HOURS PRN
Status: DISCONTINUED | OUTPATIENT
Start: 2025-07-08 | End: 2025-07-10 | Stop reason: HOSPADM

## 2025-07-08 RX ORDER — ONDANSETRON HYDROCHLORIDE 2 MG/ML
4 INJECTION, SOLUTION INTRAVENOUS EVERY 12 HOURS PRN
Status: DISCONTINUED | OUTPATIENT
Start: 2025-07-08 | End: 2025-07-10 | Stop reason: HOSPADM

## 2025-07-08 RX ORDER — METHYLPREDNISOLONE SOD SUCC 125 MG
125 VIAL (EA) INJECTION
Status: COMPLETED | OUTPATIENT
Start: 2025-07-08 | End: 2025-07-08

## 2025-07-08 RX ORDER — IPRATROPIUM BROMIDE AND ALBUTEROL SULFATE 2.5; .5 MG/3ML; MG/3ML
3 SOLUTION RESPIRATORY (INHALATION)
Status: COMPLETED | OUTPATIENT
Start: 2025-07-08 | End: 2025-07-08

## 2025-07-08 RX ORDER — FLUTICASONE FUROATE AND VILANTEROL 100; 25 UG/1; UG/1
1 POWDER RESPIRATORY (INHALATION) DAILY
Status: DISCONTINUED | OUTPATIENT
Start: 2025-07-08 | End: 2025-07-10 | Stop reason: HOSPADM

## 2025-07-08 RX ORDER — DIAZEPAM 5 MG/1
10 TABLET ORAL 2 TIMES DAILY PRN
Status: DISCONTINUED | OUTPATIENT
Start: 2025-07-08 | End: 2025-07-10 | Stop reason: HOSPADM

## 2025-07-08 RX ORDER — DOXYCYCLINE HYCLATE 100 MG
100 TABLET ORAL EVERY 12 HOURS
Status: DISCONTINUED | OUTPATIENT
Start: 2025-07-08 | End: 2025-07-10 | Stop reason: HOSPADM

## 2025-07-08 RX ORDER — IPRATROPIUM BROMIDE AND ALBUTEROL SULFATE 2.5; .5 MG/3ML; MG/3ML
3 SOLUTION RESPIRATORY (INHALATION)
Status: DISCONTINUED | OUTPATIENT
Start: 2025-07-08 | End: 2025-07-10 | Stop reason: HOSPADM

## 2025-07-08 RX ORDER — SODIUM CHLORIDE 0.9 % (FLUSH) 0.9 %
3 SYRINGE (ML) INJECTION
Status: DISCONTINUED | OUTPATIENT
Start: 2025-07-08 | End: 2025-07-10 | Stop reason: HOSPADM

## 2025-07-08 RX ORDER — PANTOPRAZOLE SODIUM 40 MG/1
40 TABLET, DELAYED RELEASE ORAL DAILY
Status: DISCONTINUED | OUTPATIENT
Start: 2025-07-08 | End: 2025-07-10 | Stop reason: HOSPADM

## 2025-07-08 RX ORDER — METOPROLOL TARTRATE 25 MG/1
25 TABLET, FILM COATED ORAL 2 TIMES DAILY
Status: DISCONTINUED | OUTPATIENT
Start: 2025-07-08 | End: 2025-07-10 | Stop reason: HOSPADM

## 2025-07-08 RX ORDER — MAGNESIUM SULFATE HEPTAHYDRATE 40 MG/ML
2 INJECTION, SOLUTION INTRAVENOUS ONCE
Status: COMPLETED | OUTPATIENT
Start: 2025-07-08 | End: 2025-07-08

## 2025-07-08 RX ORDER — ENOXAPARIN SODIUM 100 MG/ML
40 INJECTION SUBCUTANEOUS EVERY 24 HOURS
Status: DISCONTINUED | OUTPATIENT
Start: 2025-07-08 | End: 2025-07-10 | Stop reason: HOSPADM

## 2025-07-08 RX ORDER — TALC
6 POWDER (GRAM) TOPICAL NIGHTLY PRN
Status: DISCONTINUED | OUTPATIENT
Start: 2025-07-08 | End: 2025-07-10 | Stop reason: HOSPADM

## 2025-07-08 RX ORDER — GUAIFENESIN 100 MG/5ML
100 LIQUID ORAL
Status: COMPLETED | OUTPATIENT
Start: 2025-07-08 | End: 2025-07-08

## 2025-07-08 RX ORDER — LEVOTHYROXINE SODIUM 25 UG/1
75 TABLET ORAL
Status: DISCONTINUED | OUTPATIENT
Start: 2025-07-09 | End: 2025-07-10 | Stop reason: HOSPADM

## 2025-07-08 RX ORDER — PREDNISONE 20 MG/1
40 TABLET ORAL DAILY
Status: DISCONTINUED | OUTPATIENT
Start: 2025-07-08 | End: 2025-07-10 | Stop reason: HOSPADM

## 2025-07-08 RX ADMIN — IPRATROPIUM BROMIDE AND ALBUTEROL SULFATE 3 ML: .5; 3 SOLUTION RESPIRATORY (INHALATION) at 08:07

## 2025-07-08 RX ADMIN — IPRATROPIUM BROMIDE AND ALBUTEROL SULFATE 3 ML: .5; 3 SOLUTION RESPIRATORY (INHALATION) at 05:07

## 2025-07-08 RX ADMIN — PREDNISONE 40 MG: 20 TABLET ORAL at 01:07

## 2025-07-08 RX ADMIN — METHYLPREDNISOLONE SODIUM SUCCINATE 125 MG: 125 INJECTION, POWDER, FOR SOLUTION INTRAMUSCULAR; INTRAVENOUS at 06:07

## 2025-07-08 RX ADMIN — HYDRALAZINE HYDROCHLORIDE 25 MG: 25 TABLET ORAL at 11:07

## 2025-07-08 RX ADMIN — MAGNESIUM SULFATE HEPTAHYDRATE 2 G: 40 INJECTION, SOLUTION INTRAVENOUS at 06:07

## 2025-07-08 RX ADMIN — METOPROLOL TARTRATE 25 MG: 25 TABLET, FILM COATED ORAL at 08:07

## 2025-07-08 RX ADMIN — GUAIFENESIN 100 MG: 300 SOLUTION ORAL at 10:07

## 2025-07-08 RX ADMIN — ENOXAPARIN SODIUM 40 MG: 40 INJECTION SUBCUTANEOUS at 05:07

## 2025-07-08 RX ADMIN — DOXYCYCLINE HYCLATE 100 MG: 100 TABLET, COATED ORAL at 08:07

## 2025-07-08 RX ADMIN — IPRATROPIUM BROMIDE AND ALBUTEROL SULFATE 3 ML: .5; 3 SOLUTION RESPIRATORY (INHALATION) at 02:07

## 2025-07-08 RX ADMIN — PANTOPRAZOLE SODIUM 40 MG: 40 TABLET, DELAYED RELEASE ORAL at 01:07

## 2025-07-08 NOTE — PLAN OF CARE
Patient was seen at bedside and provided with REINOSO signed and orignial in blue folder. Patient got copy.   07/08/25 1534   REINOSO Message   Medicare Outpatient and Observation Notification regarding financial responsibility Given to patient/caregiver;Explained to patient/caregiver;Signed/date by patient/caregiver   Date REINOSO was signed 07/08/25   Time REINOSO was signed 1300

## 2025-07-08 NOTE — SUBJECTIVE & OBJECTIVE
Past Medical History:   Diagnosis Date    Arthritis     Breast cancer     COPD (chronic obstructive pulmonary disease)     Depression     Herpes simplex virus (HSV) infection     Hypertension     PTSD (post-traumatic stress disorder)     Thyroid disease        Past Surgical History:   Procedure Laterality Date     SECTION      DILATION AND CURETTAGE OF UTERUS  10/04/2022    HYSTEROSCOPY W/ POLYPECTOMY  10/04/2022    MASTECTOMY      OOPHORECTOMY Left     age 21    SALPINGECTOMY         Review of patient's allergies indicates:   Allergen Reactions    Latex Swelling    Sulfa (sulfonamide antibiotics)      Other reaction(s): Unknown       No current facility-administered medications on file prior to encounter.     Current Outpatient Medications on File Prior to Encounter   Medication Sig    albuterol (PROVENTIL/VENTOLIN HFA) 90 mcg/actuation inhaler inhale 2 puff by inhalation route  every 4 - 6 hours as needed as needed    anastrozole (ARIMIDEX) 1 mg Tab Take 1 mg by mouth as needed.    calcium carbonate (OS-KAYE) 500 mg calcium (1,250 mg) tablet     cariprazine (VRAYLAR) 1.5 mg Cap Take 1 capsule by mouth.    diazePAM (VALIUM) 10 MG Tab Take by mouth.    dicyclomine (BENTYL) 20 mg tablet dicyclomine 20 mg tablet    dorzolamide (TRUSOPT) 2 % ophthalmic solution INSTILL 1 DROPS INTO EACH EYE TWICE DAILY    DULoxetine (CYMBALTA) 60 MG capsule Take 60 mg by mouth every morning.    HYDROcodone-acetaminophen (NORCO)  mg per tablet 60 EA, 0 Refill(s), TAKE 1 TABLET BY MOUTH TWICE DAILY AS NEEDED FOR PAIN, 0 Refill(s)    latanoprost 0.005 % ophthalmic solution INSTILL 1 DROP INTO EACH EYE AT BEDTIME    levothyroxine (SYNTHROID) 75 MCG tablet Take by mouth.    meloxicam (MOBIC) 15 MG tablet Take 1 tablet (15 mg total) by mouth once daily. **NO REFILLS**MUST SCHEDULE ANNUAL EXAM**    metoprolol tartrate (LOPRESSOR) 25 MG tablet Take 25 mg by mouth 2 (two) times daily.    omeprazole (PRILOSEC) 40 MG capsule Take 1  capsule by mouth.    diclofenac sodium (VOLTAREN) 1 % Gel Apply topically.    fluconazole (DIFLUCAN) 150 MG Tab Take one tablet once, take additional tablets as directed by provider.    fluticasone-salmeterol diskus inhaler 250-50 mcg Inhale 1 puff into the lungs.    hydrOXYzine HCL (ATARAX) 25 MG tablet Take 2 tablets (50 mg total) by mouth every 6 (six) hours.    loratadine (CLARITIN) 10 mg tablet Take 10 mg by mouth daily as needed.    nystatin (MYCOSTATIN) cream Apply topically 2 (two) times daily.    ondansetron (ZOFRAN) 4 MG tablet Take 1 tablet (4 mg total) by mouth every 6 (six) hours as needed for Nausea.    predniSONE (DELTASONE) 20 MG tablet Take by mouth.    promethazine-dextromethorphan (PROMETHAZINE-DM) 6.25-15 mg/5 mL Syrp Take 5 mLs by mouth every 6 (six) hours as needed.     Family History       Problem Relation (Age of Onset)    Hypertension Father, Mother          Tobacco Use    Smoking status: Former    Smokeless tobacco: Never   Substance and Sexual Activity    Alcohol use: Not Currently    Drug use: Not Currently    Sexual activity: Yes     Partners: Male     Birth control/protection: Post-menopausal     Review of Systems   Constitutional:  Positive for activity change.   Respiratory:  Positive for cough, shortness of breath and wheezing.    Neurological:  Positive for headaches.   All other systems reviewed and are negative.    Objective:     Vital Signs (Most Recent):  Temp: 98.3 °F (36.8 °C) (07/08/25 1303)  Pulse: 98 (07/08/25 1320)  Resp: (!) 28 (07/08/25 1303)  BP: (!) 171/79 (07/08/25 1303)  SpO2: (!) 93 % (07/08/25 1247) Vital Signs (24h Range):  Temp:  [98 °F (36.7 °C)-98.3 °F (36.8 °C)] 98.3 °F (36.8 °C)  Pulse:  [73-98] 98  Resp:  [15-44] 28  SpO2:  [87 %-99 %] 93 %  BP: (158-233)/() 171/79     Weight: 81 kg (178 lb 9.2 oz)  Body mass index is 31.63 kg/m².     Physical Exam  Constitutional:       General: She is not in acute distress.     Appearance: She is not toxic-appearing  or diaphoretic.   HENT:      Head: Normocephalic and atraumatic.      Mouth/Throat:      Mouth: Mucous membranes are moist.      Pharynx: Oropharynx is clear.   Eyes:      General: No scleral icterus.     Extraocular Movements: Extraocular movements intact.      Conjunctiva/sclera: Conjunctivae normal.      Pupils: Pupils are equal, round, and reactive to light.   Cardiovascular:      Rate and Rhythm: Normal rate and regular rhythm.      Heart sounds: Normal heart sounds.   Pulmonary:      Effort: Pulmonary effort is normal.      Breath sounds: Wheezing and rhonchi present.   Abdominal:      General: Bowel sounds are normal. There is no distension.      Palpations: Abdomen is soft.      Tenderness: There is no abdominal tenderness. There is no guarding.   Musculoskeletal:         General: Normal range of motion.      Cervical back: Normal range of motion and neck supple.      Right lower leg: No edema.      Left lower leg: No edema.   Skin:     General: Skin is warm and dry.      Capillary Refill: Capillary refill takes less than 2 seconds.      Coloration: Skin is not jaundiced or pale.   Neurological:      Mental Status: She is alert and oriented to person, place, and time. Mental status is at baseline.   Psychiatric:         Mood and Affect: Mood normal.         Behavior: Behavior normal.              CRANIAL NERVES     CN III, IV, VI   Pupils are equal, round, and reactive to light.       Significant Labs: All pertinent labs within the past 24 hours have been reviewed.  CBC:   Recent Labs   Lab 07/08/25  0552   WBC 11.26   HGB 14.4   HCT 43.3        CMP:   Recent Labs   Lab 07/08/25  0552      K 3.9      CO2 20*      BUN 7*   CREATININE 1.1   CALCIUM 9.5   PROT 7.8   ALBUMIN 4.0   BILITOT 0.4   ALKPHOS 94   AST 27   ALT 21   ANIONGAP 11       Significant Imaging: I have reviewed all pertinent imaging results/findings within the past 24 hours.    X-Ray Chest 1 View [[id number]] Resulted:  07/08/25 0552   Order Status: Completed Updated: 07/08/25 0554   Narrative:     EXAMINATION:  XR CHEST 1 VIEW    CLINICAL HISTORY:  shortness of breath;    TECHNIQUE:  Single frontal view of the chest was performed.    COMPARISON:  02/26/2025.    FINDINGS:  The lungs are well expanded and clear. No focal opacities are seen. The pleural spaces are clear. The cardiac silhouette is unremarkable. The visualized osseous structures are unremarkable.   Impression:       No acute abnormality.      Electronically signed by: Sanjay Garcia  Date: 07/08/2025  Time: 05:52       EKG independently interpreted by me:  Normal sinus rhythm, 82 beats per minute, no ST elevation or depression noted.  Nonischemic.

## 2025-07-08 NOTE — ED NOTES
Patient resting comfortably with no complaints and verbalized she is tired and is going to take a nap

## 2025-07-08 NOTE — ED PROVIDER NOTES
Encounter Date: 2025       History     Chief Complaint   Patient presents with    Shortness of Breath     Present with SOB since approx 3-4 days, Hx: COPD/asthma      70-year-old female with a history of COPD not on home oxygen, hypertension, thyroid disease, others see below for complete list.  She presents to [facility] ED with chief complaints of progressively worsening shortness of breath, dyspnea for the past several days. She reports associated productive cough. Denies associated fever, chills, chest pain, palpitations, dependent edema, abdominal pain, recent changes in urinary or bowel habits.    The history is provided by the patient. No  was used.     Review of patient's allergies indicates:   Allergen Reactions    Latex Swelling    Sulfa (sulfonamide antibiotics)      Other reaction(s): Unknown     Past Medical History:   Diagnosis Date    Arthritis     Breast cancer     COPD (chronic obstructive pulmonary disease)     Depression     Herpes simplex virus (HSV) infection     Hypertension     PTSD (post-traumatic stress disorder)     Thyroid disease      Past Surgical History:   Procedure Laterality Date     SECTION      DILATION AND CURETTAGE OF UTERUS  10/04/2022    HYSTEROSCOPY W/ POLYPECTOMY  10/04/2022    MASTECTOMY      OOPHORECTOMY Left     age 21    SALPINGECTOMY       Family History   Problem Relation Name Age of Onset    Hypertension Father      Hypertension Mother      Breast cancer Neg Hx      Ovarian cancer Neg Hx       Social History[1]  Review of Systems   Constitutional: Negative.    HENT: Negative.     Eyes: Negative.    Respiratory:  Positive for cough, shortness of breath and wheezing.    Cardiovascular: Negative.    Gastrointestinal: Negative.    Genitourinary: Negative.    Musculoskeletal: Negative.    Neurological: Negative.    Hematological: Negative.    Psychiatric/Behavioral: Negative.     All other systems reviewed and are negative.      Physical  Exam     Initial Vitals [07/08/25 0524]   BP Pulse Resp Temp SpO2   (!) 188/91 82 (!) 38 98 °F (36.7 °C) (!) 87 %      MAP       --         Physical Exam    Nursing note and vitals reviewed.  Constitutional: She appears well-developed.   HENT:   Head: Normocephalic.   Eyes: Pupils are equal, round, and reactive to light.   Neck: No JVD present.   Normal range of motion.  Cardiovascular:  Normal rate.           Pulmonary/Chest: She is in respiratory distress. She has wheezes.   Abdominal: Abdomen is soft. Bowel sounds are normal.   Musculoskeletal:      Cervical back: Normal range of motion.     Neurological: She is alert and oriented to person, place, and time. GCS score is 15. GCS eye subscore is 4. GCS verbal subscore is 5. GCS motor subscore is 6.   Skin: Skin is warm. Capillary refill takes less than 2 seconds.   Psychiatric: She has a normal mood and affect.         ED Course   Critical Care    Date/Time: 7/11/2025 8:18 AM    Performed by: Matt Fuentes MD  Authorized by: Matt Fuentes MD  Direct patient critical care time: 10 minutes  Additional history critical care time: 5 minutes  Ordering / reviewing critical care time: 5 minutes  Documentation critical care time: 5 minutes  Consulting other physicians critical care time: 5 minutes  Other critical care time: 10 minutes  Total critical care time (exclusive of procedural time) : 40 minutes  Critical care was necessary to treat or prevent imminent or life-threatening deterioration of the following conditions: respiratory failure.  Critical care was time spent personally by me on the following activities: blood draw for specimens, development of treatment plan with patient or surrogate, discussions with consultants, discussions with primary provider, evaluation of patient's response to treatment, interpretation of cardiac output measurements, examination of patient, obtaining history from patient or surrogate, ordering and performing treatments and  interventions, ordering and review of laboratory studies, ordering and review of radiographic studies, pulse oximetry, re-evaluation of patient's condition and review of old charts.        Labs Reviewed   COMPREHENSIVE METABOLIC PANEL - Abnormal       Result Value    Sodium 141      Potassium 3.9      Chloride 110      CO2 20 (*)     Glucose 109      BUN 7 (*)     Creatinine 1.1      Calcium 9.5      Protein Total 7.8      Albumin 4.0      Bilirubin Total 0.4      ALP 94      AST 27      ALT 21      Anion Gap 11      eGFR 55 (*)    CBC WITH DIFFERENTIAL - Abnormal    WBC 11.26      RBC 4.90      HGB 14.4      HCT 43.3      MCV 88      MCH 29.4      MCHC 33.3      RDW 12.6      Platelet Count 324      MPV 10.2      Nucleated RBC 0      Neut % 34.0 (*)     Lymph % 43.2      Mono % 7.5      Eos % 13.9 (*)     Basophil % 1.1      Imm Grans % 0.3      Neut # 3.85      Lymph # 4.86 (*)     Mono # 0.84      Eos # 1.56 (*)     Baso # 0.12      Imm Grans # 0.03     ISTAT PROCEDURE - Abnormal    POC PH 7.416      POC PCO2 29.5 (*)     POC PO2 70 (*)     POC HCO3 19.0 (*)     POC BE -6 (*)     POC SATURATED O2 94      POC TCO2 20 (*)     Sample ARTERIAL      Site RR      Allens Test Pass      DelSys CPAP/BiPAP      Mode BiPAP      FiO2 40      IP 14      EP 7     SARS-COV-2 RNA AMPLIFICATION, QUAL - Normal    SARS COV-2 Molecular Negative     TROPONIN I - Normal    Troponin-I 0.014     B-TYPE NATRIURETIC PEPTIDE - Normal    BNP 17     MAGNESIUM - Normal    Magnesium  1.9     CBC W/ AUTO DIFFERENTIAL    Narrative:     The following orders were created for panel order CBC Auto Differential.  Procedure                               Abnormality         Status                     ---------                               -----------         ------                     CBC with Differential[[id number]]       Abnormal            Final result                 Please view results for these tests on the individual orders.   EXTRA TUBES     Narrative:     The following orders were created for panel order EXTRA TUBES.  Procedure                               Abnormality         Status                     ---------                               -----------         ------                     Light Blue Top Hold[[id number]]                             Final result               Gold Top Hold[[id number]]                                   Final result               Moon Top Hold[[id number]]                                   Final result                 Please view results for these tests on the individual orders.   LIGHT BLUE TOP HOLD    Extra Tube Hold for add-ons.     GOLD TOP HOLD    Extra Tube Hold for add-ons.     GREY TOP HOLD    Extra Tube Hold for add-ons.          ECG Results              EKG 12-lead (Final result)        Collection Time Result Time QRS Duration OHS QTC Calculation    07/08/25 06:00:00 07/08/25 09:49:30 68 434                     Final result by Interface, Lab In [facility] (07/08/25 09:49:40)                   Narrative:    Test Reason :    Vent. Rate :  82 BPM     Atrial Rate :  82 BPM     P-R Int : 156 ms          QRS Dur :  68 ms      QT Int : 372 ms       P-R-T Axes :  72  64  78 degrees    QTcB Int : 434 ms    Normal sinus rhythm  Normal ECG  When compared with ECG of 08-Jul-2025 05:59,  No significant change was found  Confirmed by Khoi Carlos ([id number]) on 7/8/2025 9:49:26 AM    Referred By: AAAREFERRAL SELF           Confirmed By: Khoi Carlos                                     EKG 12-lead (Final result)        Collection Time Result Time QRS Duration OHS QTC Calculation    07/08/25 05:59:04 07/08/25 09:49:28 70 433                     Final result by Interface, Lab In [facility] (07/08/25 09:49:36)                   Narrative:    Test Reason : R06.02,    Vent. Rate :  75 BPM     Atrial Rate :  75 BPM     P-R Int : 154 ms          QRS Dur :  70 ms      QT Int : 388 ms       P-R-T Axes :  77  62  70 degrees    QTcB Int : 433 ms    Normal  sinus rhythm  Normal ECG  When compared with ECG of 29-Apr-2019 14:56,  Premature atrial complexes are no longer Present  Questionable change in The axis  Confirmed by Khoi Carlos ([id number]) on 7/8/2025 9:49:22 AM    Referred By: AAAREFERRAL SELF           Confirmed By: Khoi Carlos                                  Imaging Results              X-Ray Chest 1 View (Final result)  Result time 07/08/25 05:52:11      Final result by Sanjay Garcia DO (07/08/25 05:52:11)                   Impression:      No acute abnormality.      Electronically signed by: Sanjay Garcia  Date:    07/08/2025  Time:    05:52               Narrative:    EXAMINATION:  XR CHEST 1 VIEW    CLINICAL HISTORY:  shortness of breath;    TECHNIQUE:  Single frontal view of the chest was performed.    COMPARISON:  02/26/2025.    FINDINGS:  The lungs are well expanded and clear. No focal opacities are seen. The pleural spaces are clear. The cardiac silhouette is unremarkable. The visualized osseous structures are unremarkable.                                       Medications   albuterol-ipratropium 2.5 mg-0.5 mg/3 mL nebulizer solution 3 mL (3 mLs Nebulization Given 7/8/25 0544)   albuterol-ipratropium 2.5 mg-0.5 mg/3 mL nebulizer solution 3 mL (3 mLs Nebulization Given 7/8/25 0545)   methylPREDNISolone sodium succinate injection 125 mg (125 mg Intravenous Given 7/8/25 0617)   magnesium sulfate 2g in water 50mL IVPB (premix) (0 g Intravenous Stopped 7/8/25 1053)   guaiFENesin 100 mg/5 ml syrup 100 mg (100 mg Oral Given 7/8/25 1050)   methylPREDNISolone sodium succinate injection 60 mg (60 mg Intravenous Given 7/9/25 1743)   LORazepam (ATIVAN) injection 1 mg (1 mg Intravenous Given 7/9/25 1558)     Medical Decision Making  Ddx COPD, CHF, ACS, others  Auditory wheezing  tachypneic upon arrival, placed on BiPAP  Lab work, imaging pending at shift change  Hand off to Dr. Fonseca at 6 am      Amount and/or Complexity of Data Reviewed  Labs: ordered.  Radiology:  ordered.    Risk  OTC drugs.  Prescription drug management.                                      Clinical Impression:  Final diagnoses:  [R06.02] Shortness of breath  [J44.1] COPD exacerbation (Primary)          ED Disposition Condition    Observation                       [1]   Social History  Tobacco Use    Smoking status: Former    Smokeless tobacco: Never   Substance Use Topics    Alcohol use: Not Currently    Drug use: Not Currently        Matt Fuentes MD  07/13/25 0701

## 2025-07-08 NOTE — AI DETERIORATION ALERT
Artificial Intelligence Notification  [address] [place] [address]  Phone: [phone]    This documentation was triggered by an Artificial Intelligence Notification:    Admit Date: 2025   LOS: 0  Code Status: Full Code  : 1955  Age: 70 y.o.  Weight:   Wt Readings from Last 1 Encounters:   25 81 kg (178 lb 9.2 oz)        Sex: female  Bed:   MRN: [id number]  Attending Physician: Khoi Quintero MD     Date of Alert: 2025  Time AI Alert Received: 1703            Vitals:    25 1702   BP:    Pulse: 107   Resp: (!) 44   Temp:      SpO2: (!) 91 %      Artificial Intelligence alert discussed with Provider:     Name: Gabrielle Terrazas NP    Date/Time of Provider Notification: 2025 @ 1714      Patient Condition: Patient sitting up eating dinner. Family at bedside. No acute distress noted. Patient denies shortness of breath. NP notified. No further orders received at this time.

## 2025-07-08 NOTE — PLAN OF CARE
Problem: COPD (Chronic Obstructive Pulmonary Disease)  Goal: Optimal Chronic Illness Coping  Outcome: Progressing  Goal: Optimal Level of Functional [place]  Outcome: Progressing  Goal: Absence of Infection Signs and Symptoms  Outcome: Progressing  Goal: Improved Oral Intake  Outcome: Progressing  Goal: Effective Oxygenation and Ventilation  Outcome: Progressing     Problem: Adult Inpatient Plan of Care  Goal: Plan of Care Review  Outcome: Progressing  Goal: Patient-Specific Goal (Individualized)  Outcome: Progressing  Goal: Absence of Hospital-Acquired Illness or Injury  Outcome: Progressing  Goal: Optimal Comfort and Wellbeing  Outcome: Progressing  Goal: Readiness for Transition of Care  Outcome: Progressing     Problem: Infection  Goal: Absence of Infection Signs and Symptoms  Outcome: Progressing

## 2025-07-08 NOTE — ASSESSMENT & PLAN NOTE
Patient's blood pressure range in the last 24 hours was: BP  Min: 158/84  Max: 233/105.The patient's inpatient anti-hypertensive regimen is listed below:  Current Antihypertensives  metoprolol tartrate (LOPRESSOR) tablet 25 mg, 2 times daily, Oral    Plan  - BP is controlled, no changes needed to their regimen

## 2025-07-08 NOTE — ASSESSMENT & PLAN NOTE
Patient has chronic hypothyroidism. Will continue chronic levothyroxine and adjust for and clinical changes.

## 2025-07-08 NOTE — PLAN OF CARE
[facility]  Initial Discharge Assessment       Primary Care Provider: Macy Omalley NP    Admission Diagnosis: Shortness of breath [R06.02]    Admission Date: 7/8/2025  Expected Discharge Date:          Payor: HU[employer] MANAGED MEDICARE / Plan: [employer] SNP HMO PPO SPECIAL NEEDS / Product Type: Medicare Advantage /     Extended Emergency Contact Information  Primary Emergency Contact: Rakesh Cheek  Address: [address] [place] [address] [place]  Home Phone: [phone]  Mobile Phone: [phone]  Relation: Spouse    Discharge Plan A: Home with family  Discharge Plan B: Home with family  Patient was met at bedside. Patients spouse Fredi [phone] will transport the patient.PCP is Abigail Lafluer.Pharmacy is  [facility].Patient reports dizzy spells [place] notified.Demographics correct.           [facility] [place] [facility] [address]  [address] [address]  Phone: [phone] Fax: [phone]      Initial Assessment (most recent)       Adult Discharge Assessment - 07/08/25 1537          Discharge Assessment    Assessment Type Discharge Planning Assessment     Confirmed/corrected address, phone number and insurance Yes     Confirmed Demographics Correct on Facesheet     Source of Information patient     Communicated ELSIE with patient/caregiver Yes     People in Home spouse     Name(s) of People in Home Spouse-Rakesh Cheek [phone]     Facility Arrived From: home     Do you expect to return to your current living situation? Yes     Do you have help at home or someone to help you manage your care at home? Yes     Who are your caregiver(s) and their phone number(s)? Spouse     Prior to hospitilization cognitive status: Alert/Oriented     Current cognitive status: Alert/Oriented     Walking or Climbing Stairs Difficulty yes   Patient unstable in walking falls    Walking or Climbing Stairs ambulation difficulty, requires  equipment     Mobility Management patient has been having issues with her balance     Dressing/Bathing Difficulty no     Home Layout Able to live on 1st floor     Equipment Currently Used at Home none     Readmission within 30 days? No     Patient currently being followed by outpatient case management? No     Do you currently have service(s) that help you manage your care at home? No     Do you take prescription medications? Yes     Do you have prescription coverage? Yes     Do you have any problems affording any of your prescribed medications? No     Is the patient taking medications as prescribed? yes     Who is going to help you get home at discharge? Rakesh Cheek (Spouse)  [phone]     How do you get to doctors appointments? family or friend will provide;car, drives self     Are you on dialysis? No     Do you take coumadin? No     Discharge Plan A Home with family     Discharge Plan B Home with family     DME Needed Upon Discharge  none

## 2025-07-08 NOTE — ASSESSMENT & PLAN NOTE
Patient with Hypoxic Respiratory failure which is Acute.  she is not on home oxygen. Supplemental oxygen was provided and noted- Oxygen Concentration (%):  [32-40] 40    .   Signs/symptoms of respiratory failure include- tachypnea, increased work of breathing, and wheezing. Contributing diagnoses includes - COPD Labs and images were reviewed. Patient Has recent ABG, which has been reviewed. Will treat underlying causes and adjust management of respiratory failure as follows-     Supplemental oxygen to keep oxygen saturation greater than 90%   Treatment of COPD as below.

## 2025-07-08 NOTE — HPI
Hilda Cheek is a 69 yo female with PMHx significant for HTN, hypothyroidism, and COPD not on home O2.  She presented to the ED with a 4 day onset of worsening shortness of breath associated with productive cough and wheezing.  She reports using her albuterol inhaler, but it was not helping much.  She denies fever, chest pain, abdominal pain, nausea, vomiting, dysuria, or any other symptoms at this time.  In the ED she was noted to be hypoxic and was placed on supplemental oxygen eventually needing BiPAP for short period of time.  She was given IV Solu-Medrol, magnesium, and bronchodilators.  She was able to wean down to 5 L nasal cannula.  Patient will be admitted to the services of Hospital Medicine for further evaluation and treatment.

## 2025-07-08 NOTE — H&P
[facility] Medicine  History & Physical    Patient Name: Hilda Cheek  MRN: [id number]  Patient Class: OP- Observation  Admission Date: 2025  Attending Physician: Khoi Quinteor MD   Primary Care Provider: Macy Omalley NP         Patient information was obtained from patient, past medical records, and ER records.     Subjective:     Principal Problem:Acute hypoxic respiratory failure    Chief Complaint:   Chief Complaint   Patient presents with    Shortness of Breath     Present with SOB since approx 3-4 days, Hx: COPD/asthma         HPI: Hilda Cheek is a 69 yo female with PMHx significant for HTN, hypothyroidism, and COPD not on home O2.  She presented to the ED with a 4 day onset of worsening shortness of breath associated with productive cough and wheezing.  She reports using her albuterol inhaler, but it was not helping much.  She denies fever, chest pain, abdominal pain, nausea, vomiting, dysuria, or any other symptoms at this time.  In the ED she was noted to be hypoxic and was placed on supplemental oxygen eventually needing BiPAP for short period of time.  She was given IV Solu-Medrol, magnesium, and bronchodilators.  She was able to wean down to 5 L nasal cannula.  Patient will be admitted to the services of Hospital Medicine for further evaluation and treatment.    Past Medical History:   Diagnosis Date    Arthritis     Breast cancer     COPD (chronic obstructive pulmonary disease)     Depression     Herpes simplex virus (HSV) infection     Hypertension     PTSD (post-traumatic stress disorder)     Thyroid disease        Past Surgical History:   Procedure Laterality Date     SECTION      DILATION AND CURETTAGE OF UTERUS  10/04/2022    HYSTEROSCOPY W/ POLYPECTOMY  10/04/2022    MASTECTOMY      OOPHORECTOMY Left     age 21    SALPINGECTOMY         Review of patient's allergies indicates:   Allergen Reactions    Latex Swelling    Sulfa  (sulfonamide antibiotics)      Other reaction(s): Unknown       No current facility-administered medications on file prior to encounter.     Current Outpatient Medications on File Prior to Encounter   Medication Sig    albuterol (PROVENTIL/VENTOLIN HFA) 90 mcg/actuation inhaler inhale 2 puff by inhalation route  every 4 - 6 hours as needed as needed    anastrozole (ARIMIDEX) 1 mg Tab Take 1 mg by mouth as needed.    calcium carbonate (OS-KAYE) 500 mg calcium (1,250 mg) tablet     cariprazine (VRAYLAR) 1.5 mg Cap Take 1 capsule by mouth.    diazePAM (VALIUM) 10 MG Tab Take by mouth.    dicyclomine (BENTYL) 20 mg tablet dicyclomine 20 mg tablet    dorzolamide (TRUSOPT) 2 % ophthalmic solution INSTILL 1 DROPS INTO EACH EYE TWICE DAILY    DULoxetine (CYMBALTA) 60 MG capsule Take 60 mg by mouth every morning.    HYDROcodone-acetaminophen (NORCO)  mg per tablet 60 EA, 0 Refill(s), TAKE 1 TABLET BY MOUTH TWICE DAILY AS NEEDED FOR PAIN, 0 Refill(s)    latanoprost 0.005 % ophthalmic solution INSTILL 1 DROP INTO EACH EYE AT BEDTIME    levothyroxine (SYNTHROID) 75 MCG tablet Take by mouth.    meloxicam (MOBIC) 15 MG tablet Take 1 tablet (15 mg total) by mouth once daily. **NO REFILLS**MUST SCHEDULE ANNUAL EXAM**    metoprolol tartrate (LOPRESSOR) 25 MG tablet Take 25 mg by mouth 2 (two) times daily.    omeprazole (PRILOSEC) 40 MG capsule Take 1 capsule by mouth.    diclofenac sodium (VOLTAREN) 1 % Gel Apply topically.    fluconazole (DIFLUCAN) 150 MG Tab Take one tablet once, take additional tablets as directed by provider.    fluticasone-salmeterol diskus inhaler 250-50 mcg Inhale 1 puff into the lungs.    hydrOXYzine HCL (ATARAX) 25 MG tablet Take 2 tablets (50 mg total) by mouth every 6 (six) hours.    loratadine (CLARITIN) 10 mg tablet Take 10 mg by mouth daily as needed.    nystatin (MYCOSTATIN) cream Apply topically 2 (two) times daily.    ondansetron (ZOFRAN) 4 MG tablet Take 1 tablet (4 mg total) by mouth every 6  (six) hours as needed for Nausea.    predniSONE (DELTASONE) 20 MG tablet Take by mouth.    promethazine-dextromethorphan (PROMETHAZINE-DM) 6.25-15 mg/5 mL Syrp Take 5 mLs by mouth every 6 (six) hours as needed.     Family History       Problem Relation (Age of Onset)    Hypertension Father, Mother          Tobacco Use    Smoking status: Former    Smokeless tobacco: Never   Substance and Sexual Activity    Alcohol use: Not Currently    Drug use: Not Currently    Sexual activity: Yes     Partners: Male     Birth control/protection: Post-menopausal     Review of Systems   Constitutional:  Positive for activity change.   Respiratory:  Positive for cough, shortness of breath and wheezing.    Neurological:  Positive for headaches.   All other systems reviewed and are negative.    Objective:     Vital Signs (Most Recent):  Temp: 98.3 °F (36.8 °C) (07/08/25 1303)  Pulse: 98 (07/08/25 1320)  Resp: (!) 28 (07/08/25 1303)  BP: (!) 171/79 (07/08/25 1303)  SpO2: (!) 93 % (07/08/25 1247) Vital Signs (24h Range):  Temp:  [98 °F (36.7 °C)-98.3 °F (36.8 °C)] 98.3 °F (36.8 °C)  Pulse:  [73-98] 98  Resp:  [15-44] 28  SpO2:  [87 %-99 %] 93 %  BP: (158-233)/() 171/79     Weight: 81 kg (178 lb 9.2 oz)  Body mass index is 31.63 kg/m².     Physical Exam  Constitutional:       General: She is not in acute distress.     Appearance: She is not toxic-appearing or diaphoretic.   HENT:      Head: Normocephalic and atraumatic.      Mouth/Throat:      Mouth: Mucous membranes are moist.      Pharynx: Oropharynx is clear.   Eyes:      General: No scleral icterus.     Extraocular Movements: Extraocular movements intact.      Conjunctiva/sclera: Conjunctivae normal.      Pupils: Pupils are equal, round, and reactive to light.   Cardiovascular:      Rate and Rhythm: Normal rate and regular rhythm.      Heart sounds: Normal heart sounds.   Pulmonary:      Effort: Pulmonary effort is normal.      Breath sounds: Wheezing and rhonchi present.    Abdominal:      General: Bowel sounds are normal. There is no distension.      Palpations: Abdomen is soft.      Tenderness: There is no abdominal tenderness. There is no guarding.   Musculoskeletal:         General: Normal range of motion.      Cervical back: Normal range of motion and neck supple.      Right lower leg: No edema.      Left lower leg: No edema.   Skin:     General: Skin is warm and dry.      Capillary Refill: Capillary refill takes less than 2 seconds.      Coloration: Skin is not jaundiced or pale.   Neurological:      Mental Status: She is alert and oriented to person, place, and time. Mental status is at baseline.   Psychiatric:         Mood and Affect: Mood normal.         Behavior: Behavior normal.              CRANIAL NERVES     CN III, IV, VI   Pupils are equal, round, and reactive to light.       Significant Labs: All pertinent labs within the past 24 hours have been reviewed.  CBC:   Recent Labs   Lab 07/08/25 0552   WBC 11.26   HGB 14.4   HCT 43.3        CMP:   Recent Labs   Lab 07/08/25 0552      K 3.9      CO2 20*      BUN 7*   CREATININE 1.1   CALCIUM 9.5   PROT 7.8   ALBUMIN 4.0   BILITOT 0.4   ALKPHOS 94   AST 27   ALT 21   ANIONGAP 11       Significant Imaging: I have reviewed all pertinent imaging results/findings within the past 24 hours.    X-Ray Chest 1 View [[id number]] Resulted: 07/08/25 0552   Order Status: Completed Updated: 07/08/25 0554   Narrative:     EXAMINATION:  XR CHEST 1 VIEW    CLINICAL HISTORY:  shortness of breath;    TECHNIQUE:  Single frontal view of the chest was performed.    COMPARISON:  02/26/2025.    FINDINGS:  The lungs are well expanded and clear. No focal opacities are seen. The pleural spaces are clear. The cardiac silhouette is unremarkable. The visualized osseous structures are unremarkable.   Impression:       No acute abnormality.      Electronically signed by: Sanjay Garcia  Date: 07/08/2025  Time: 05:52       EKG  independently interpreted by me:  Normal sinus rhythm, 82 beats per minute, no ST elevation or depression noted.  Nonischemic.  Assessment/Plan:     Assessment & Plan  Acute hypoxic respiratory failure  Patient with Hypoxic Respiratory failure which is Acute.  she is not on home oxygen. Supplemental oxygen was provided and noted- Oxygen Concentration (%):  [32-40] 40    .   Signs/symptoms of respiratory failure include- tachypnea, increased work of breathing, and wheezing. Contributing diagnoses includes - COPD Labs and images were reviewed. Patient Has recent ABG, which has been reviewed. Will treat underlying causes and adjust management of respiratory failure as follows-     Supplemental oxygen to keep oxygen saturation greater than 90%   Treatment of COPD as below.  COPD exacerbation  Patient's COPD is with exacerbation noted by continued dyspnea and worsening of baseline hypoxia currently.  Patient is currently on COPD Pathway. Continue scheduled inhalers Steroids, Antibiotics, and Supplemental oxygen and monitor respiratory status closely.   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  Continue Arimidex.    HTN (hypertension)  Patient's blood pressure range in the last 24 hours was: BP  Min: 158/84  Max: 233/105.The patient's inpatient anti-hypertensive regimen is listed below:  Current Antihypertensives  metoprolol tartrate (LOPRESSOR) tablet 25 mg, 2 times daily, Oral    Plan  - BP is controlled, no changes needed to their regimen    Hypothyroidism  Patient has chronic hypothyroidism. Will continue chronic levothyroxine and adjust for and clinical changes.      VTE Risk Mitigation (From admission, onward)           Ordered     enoxaparin injection 40 mg  Daily         07/08/25 1215     IP VTE HIGH RISK PATIENT  Once         07/08/25 1215     Place sequential compression device  Until discontinued         07/08/25 1215                                     On 07/08/2025, patient should  be placed in hospital observation services under my care in collaboration with Dr. Quintero.           Gabrielle Do, NP  Department of [facility]

## 2025-07-08 NOTE — NURSING
Pt A&O x4, very pleasant and cooperative.  Frequent, productive cough this shift, sputum culture sent, pending results.  Pt received breathing treatment due to shortness of breath, Physician notified, d-dimer lab drawn,  symptoms resolved.   Call light within reach and all needs met at this time.

## 2025-07-09 LAB
ABSOLUTE EOSINOPHIL (OHS): 0 K/UL
ABSOLUTE MONOCYTE (OHS): 0.98 K/UL (ref 0.3–1)
ABSOLUTE NEUTROPHIL COUNT (OHS): 13.39 K/UL (ref 1.8–7.7)
ANION GAP (OHS): 13 MMOL/L (ref 8–16)
BASOPHILS # BLD AUTO: 0.04 K/UL
BASOPHILS NFR BLD AUTO: 0.2 %
BUN SERPL-MCNC: 10 MG/DL (ref 8–23)
CALCIUM SERPL-MCNC: 9.8 MG/DL (ref 8.7–10.5)
CHLORIDE SERPL-SCNC: 107 MMOL/L (ref 95–110)
CO2 SERPL-SCNC: 17 MMOL/L (ref 23–29)
CREAT SERPL-MCNC: 1 MG/DL (ref 0.5–1.4)
ERYTHROCYTE [DISTWIDTH] IN BLOOD BY AUTOMATED COUNT: 13.1 % (ref 11.5–14.5)
GFR SERPLBLD CREATININE-BSD FMLA CKD-EPI: >60 ML/MIN/1.73/M2
GLUCOSE SERPL-MCNC: 140 MG/DL (ref 70–110)
HCT VFR BLD AUTO: 47 % (ref 37–48.5)
HGB BLD-MCNC: 14.9 GM/DL (ref 12–16)
IMM GRANULOCYTES # BLD AUTO: 0.09 K/UL (ref 0–0.04)
IMM GRANULOCYTES NFR BLD AUTO: 0.5 % (ref 0–0.5)
LYMPHOCYTES # BLD AUTO: 3.73 K/UL (ref 1–4.8)
MAGNESIUM SERPL-MCNC: 2.2 MG/DL (ref 1.6–2.6)
MCH RBC QN AUTO: 29.4 PG (ref 27–31)
MCHC RBC AUTO-ENTMCNC: 31.7 G/DL (ref 32–36)
MCV RBC AUTO: 93 FL (ref 82–98)
NUCLEATED RBC (/100WBC) (OHS): 0 /100 WBC
PHOSPHATE SERPL-MCNC: 3 MG/DL (ref 2.7–4.5)
PLATELET # BLD AUTO: 299 K/UL (ref 150–450)
PMV BLD AUTO: 10.4 FL (ref 9.2–12.9)
POTASSIUM SERPL-SCNC: 4.6 MMOL/L (ref 3.5–5.1)
RBC # BLD AUTO: 5.06 M/UL (ref 4–5.4)
RELATIVE EOSINOPHIL (OHS): 0 %
RELATIVE LYMPHOCYTE (OHS): 20.5 % (ref 18–48)
RELATIVE MONOCYTE (OHS): 5.4 % (ref 4–15)
RELATIVE NEUTROPHIL (OHS): 73.4 % (ref 38–73)
SODIUM SERPL-SCNC: 137 MMOL/L (ref 136–145)
WBC # BLD AUTO: 18.23 K/UL (ref 3.9–12.7)

## 2025-07-09 PROCEDURE — 99232 SBSQ HOSP IP/OBS MODERATE 35: CPT | Mod: 95,,, | Performed by: NURSE PRACTITIONER

## 2025-07-09 PROCEDURE — 27000221 HC OXYGEN, UP TO 24 HOURS

## 2025-07-09 PROCEDURE — 94760 N-INVAS EAR/PLS OXIMETRY 1: CPT

## 2025-07-09 PROCEDURE — 83735 ASSAY OF MAGNESIUM: CPT | Performed by: NURSE PRACTITIONER

## 2025-07-09 PROCEDURE — 25000003 PHARM REV CODE 250: Performed by: NURSE PRACTITIONER

## 2025-07-09 PROCEDURE — 63600175 PHARM REV CODE 636 W HCPCS: Mod: UD | Performed by: NURSE PRACTITIONER

## 2025-07-09 PROCEDURE — 84100 ASSAY OF PHOSPHORUS: CPT | Performed by: NURSE PRACTITIONER

## 2025-07-09 PROCEDURE — 80048 BASIC METABOLIC PNL TOTAL CA: CPT | Performed by: NURSE PRACTITIONER

## 2025-07-09 PROCEDURE — 36415 COLL VENOUS BLD VENIPUNCTURE: CPT | Performed by: NURSE PRACTITIONER

## 2025-07-09 PROCEDURE — 21400001 HC TELEMETRY ROOM

## 2025-07-09 PROCEDURE — 94640 AIRWAY INHALATION TREATMENT: CPT

## 2025-07-09 PROCEDURE — 99900035 HC TECH TIME PER 15 MIN (STAT)

## 2025-07-09 PROCEDURE — 94799 UNLISTED PULMONARY SVC/PX: CPT

## 2025-07-09 PROCEDURE — 27000207 HC ISOLATION

## 2025-07-09 PROCEDURE — 25000242 PHARM REV CODE 250 ALT 637 W/ HCPCS: Performed by: NURSE PRACTITIONER

## 2025-07-09 PROCEDURE — 94761 N-INVAS EAR/PLS OXIMETRY MLT: CPT

## 2025-07-09 PROCEDURE — 85025 COMPLETE CBC W/AUTO DIFF WBC: CPT | Performed by: NURSE PRACTITIONER

## 2025-07-09 PROCEDURE — 25000003 PHARM REV CODE 250: Performed by: HOSPITALIST

## 2025-07-09 PROCEDURE — 97162 PT EVAL MOD COMPLEX 30 MIN: CPT

## 2025-07-09 PROCEDURE — 25000003 PHARM REV CODE 250: Performed by: INTERNAL MEDICINE

## 2025-07-09 PROCEDURE — 63600175 PHARM REV CODE 636 W HCPCS: Mod: UD | Performed by: INTERNAL MEDICINE

## 2025-07-09 PROCEDURE — 94640 AIRWAY INHALATION TREATMENT: CPT | Mod: XB

## 2025-07-09 RX ORDER — HYDROCODONE BITARTRATE AND ACETAMINOPHEN 10; 325 MG/1; MG/1
1 TABLET ORAL EVERY 6 HOURS PRN
Refills: 0 | Status: DISCONTINUED | OUTPATIENT
Start: 2025-07-09 | End: 2025-07-10 | Stop reason: HOSPADM

## 2025-07-09 RX ORDER — GUAIFENESIN 100 MG/5ML
200 LIQUID ORAL EVERY 4 HOURS PRN
Status: DISCONTINUED | OUTPATIENT
Start: 2025-07-09 | End: 2025-07-09

## 2025-07-09 RX ORDER — DICYCLOMINE HYDROCHLORIDE 10 MG/1
20 CAPSULE ORAL 2 TIMES DAILY
Status: DISCONTINUED | OUTPATIENT
Start: 2025-07-09 | End: 2025-07-10 | Stop reason: HOSPADM

## 2025-07-09 RX ORDER — LISINOPRIL 10 MG/1
10 TABLET ORAL DAILY
Status: DISCONTINUED | OUTPATIENT
Start: 2025-07-09 | End: 2025-07-10 | Stop reason: HOSPADM

## 2025-07-09 RX ORDER — DORZOLAMIDE HCL 20 MG/ML
1 SOLUTION/ DROPS OPHTHALMIC 3 TIMES DAILY
Status: DISCONTINUED | OUTPATIENT
Start: 2025-07-09 | End: 2025-07-10 | Stop reason: HOSPADM

## 2025-07-09 RX ORDER — ANASTROZOLE 1 MG/1
TABLET ORAL DAILY
Status: DISCONTINUED | OUTPATIENT
Start: 2025-07-09 | End: 2025-07-10 | Stop reason: HOSPADM

## 2025-07-09 RX ORDER — GUAIFENESIN 600 MG/1
1200 TABLET, EXTENDED RELEASE ORAL 2 TIMES DAILY
Status: DISCONTINUED | OUTPATIENT
Start: 2025-07-09 | End: 2025-07-10 | Stop reason: HOSPADM

## 2025-07-09 RX ORDER — BENZONATATE 100 MG/1
200 CAPSULE ORAL 3 TIMES DAILY PRN
Status: DISCONTINUED | OUTPATIENT
Start: 2025-07-09 | End: 2025-07-10 | Stop reason: HOSPADM

## 2025-07-09 RX ORDER — LATANOPROST 50 UG/ML
1 SOLUTION/ DROPS OPHTHALMIC NIGHTLY
Status: DISCONTINUED | OUTPATIENT
Start: 2025-07-09 | End: 2025-07-10 | Stop reason: HOSPADM

## 2025-07-09 RX ADMIN — DOXYCYCLINE HYCLATE 100 MG: 100 TABLET, COATED ORAL at 08:07

## 2025-07-09 RX ADMIN — IPRATROPIUM BROMIDE AND ALBUTEROL SULFATE 3 ML: .5; 3 SOLUTION RESPIRATORY (INHALATION) at 05:07

## 2025-07-09 RX ADMIN — IPRATROPIUM BROMIDE AND ALBUTEROL SULFATE 3 ML: .5; 3 SOLUTION RESPIRATORY (INHALATION) at 01:07

## 2025-07-09 RX ADMIN — FLUTICASONE FUROATE AND VILANTEROL TRIFENATATE 1 PUFF: 100; 25 POWDER RESPIRATORY (INHALATION) at 07:07

## 2025-07-09 RX ADMIN — PREDNISONE 40 MG: 20 TABLET ORAL at 08:07

## 2025-07-09 RX ADMIN — ANASTROZOLE TABLETS: 1 TABLET ORAL at 01:07

## 2025-07-09 RX ADMIN — LISINOPRIL 10 MG: 10 TABLET ORAL at 08:07

## 2025-07-09 RX ADMIN — ENOXAPARIN SODIUM 40 MG: 40 INJECTION SUBCUTANEOUS at 04:07

## 2025-07-09 RX ADMIN — IPRATROPIUM BROMIDE AND ALBUTEROL SULFATE 3 ML: .5; 3 SOLUTION RESPIRATORY (INHALATION) at 07:07

## 2025-07-09 RX ADMIN — LORAZEPAM 1 MG: 2 INJECTION INTRAMUSCULAR; INTRAVENOUS at 03:07

## 2025-07-09 RX ADMIN — GUAIFENESIN 200 MG: 300 SOLUTION ORAL at 01:07

## 2025-07-09 RX ADMIN — METHYLPREDNISOLONE SODIUM SUCCINATE 60 MG: 40 INJECTION, POWDER, FOR SOLUTION INTRAMUSCULAR; INTRAVENOUS at 05:07

## 2025-07-09 RX ADMIN — RISPERIDONE 1.5 MG: 0.25 TABLET, FILM COATED ORAL at 04:07

## 2025-07-09 RX ADMIN — GUAIFENESIN 1200 MG: 600 TABLET, EXTENDED RELEASE ORAL at 08:07

## 2025-07-09 RX ADMIN — METOPROLOL TARTRATE 25 MG: 25 TABLET, FILM COATED ORAL at 08:07

## 2025-07-09 RX ADMIN — PANTOPRAZOLE SODIUM 40 MG: 40 TABLET, DELAYED RELEASE ORAL at 08:07

## 2025-07-09 RX ADMIN — LATANOPROST 1 DROP: 50 SOLUTION OPHTHALMIC at 08:07

## 2025-07-09 RX ADMIN — DICYCLOMINE HYDROCHLORIDE 20 MG: 10 CAPSULE ORAL at 08:07

## 2025-07-09 RX ADMIN — BENZONATATE 200 MG: 100 CAPSULE ORAL at 09:07

## 2025-07-09 RX ADMIN — LEVOTHYROXINE SODIUM 75 MCG: 0.03 TABLET ORAL at 05:07

## 2025-07-09 RX ADMIN — DIAZEPAM 10 MG: 5 TABLET ORAL at 05:07

## 2025-07-09 NOTE — ASSESSMENT & PLAN NOTE
Patient's blood pressure range in the last 24 hours was: BP  Min: 159/77  Max: 182/85.The patient's inpatient anti-hypertensive regimen is listed below:  Current Antihypertensives  metoprolol tartrate (LOPRESSOR) tablet 25 mg, 2 times daily, Oral  hydrALAZINE tablet 25 mg, Every 8 hours PRN, Oral  lisinopriL tablet 10 mg, Daily, Oral    Plan  - BP is controlled, no changes needed to their regimen

## 2025-07-09 NOTE — PT/OT/SLP EVAL
Physical Therapy Evaluation    Patient Name:  Hilda Cheek   MRN:  [id number]    Recommendations:     Discharge Recommendations: No Therapy Indicated   Discharge Equipment Recommendations: none   Barriers to discharge: Increased level of assistance, ongoing medical treatment    Assessment:     Hilda Cheek is a 70 y.o. female admitted with a medical diagnosis of Acute hypoxic respiratory failure.  She presents with the following impairments/functional limitations: impaired functional mobility, gait instability, impaired cardiopulmonary response to activity.    The patient presented to the emergency department on 7/8/25 with complaints of shortness of breath.    Rehab Prognosis: Good; patient would benefit from acute skilled PT services to address these deficits and reach maximum level of function.    Recent Surgery: * No surgery found *      Plan:     During this hospitalization, patient to be seen 5 x/week to address the identified rehab impairments via gait training, therapeutic activities, therapeutic exercises and progress toward the following goals:    Plan of Care Expires:  07/23/25    Subjective     Chief Complaint: The patient complains of lack of sleep due to continuous coughing.  Patient/Family Comments/goals: The patient would like to return to independent mobility.  Pain/Comfort:  Pain Rating 1: 0/10    Patients cultural, spiritual, Adventism conflicts given the current situation: no    Living Environment:  The patient lives with her  in a single story home with no steps to enter the residence.  Prior to admission, patients level of function was independent sometimes using a quad cane.  Equipment used at home: cane, quad.  DME owned (not currently used): none.  Upon discharge, patient will have assistance from her .    Objective:     Communicated with nurse prior to session.  Patient found HOB elevated with telemetry, blood pressure cuff, pulse ox (continuous), PureWick, peripheral  IV, oxygen  upon PT entry to room.    General Precautions: Standard, fall, contact, respiratory  Orthopedic Precautions:N/A   Braces: N/A  Respiratory Status: Nasal cannula, flow 4 L/min    Exams:  Cognitive Exam:  Patient is oriented to Person, Place, Time, and Situation  Gross Motor Coordination:  WFL  Postural Exam:  Patient presented with the following abnormalities:    -       No postural abnormalities identified  Sensation:    -       Intact  RLE ROM: WFL  RLE Strength: WFL  LLE ROM: WFL  LLE Strength: WFL    Functional Mobility:  Bed Mobility:     Supine to Sit: supervision  Sit to Supine: supervision  Transfers:     Sit to Stand:  stand by assistance with no AD  Gait: Ambulates with CGA approximately 30 feet      AM-PAC 6 CLICK MOBILITY  Total Score:17       Treatment & Education:  Patient educated on role of acute care physical therapy, the physical therapy plan of care, and the goals of physical therapy.  Patient educated on safety while in hospital including calling nurse for mobility, and call light usage.        Patient left HOB elevated with all lines intact, call button in reach, bed alarm on, and nurse notified.    GOALS:   Multidisciplinary Problems       Physical Therapy Goals          Problem: Physical Therapy    Goal Priority Disciplines Outcome Interventions   Physical Therapy Goal     PT, PT/OT Ongoing    Description: Goals    Patient to transfer supine <> sit with no assistance.  Patient to transfer bed <> chair via stand-pivot with no assistance.  Patient to ambulate with no AD and no assistance > 100 feet.                        DME Justifications:  No DME recommended requiring DME justifications    History:     Past Medical History:   Diagnosis Date    Arthritis     Breast cancer     COPD (chronic obstructive pulmonary disease)     Depression     Herpes simplex virus (HSV) infection     Hypertension     PTSD (post-traumatic stress disorder)     Thyroid disease        Past Surgical History:    Procedure Laterality Date     SECTION      DILATION AND CURETTAGE OF UTERUS  10/04/2022    HYSTEROSCOPY W/ POLYPECTOMY  10/04/2022    MASTECTOMY      OOPHORECTOMY Left     age 21    SALPINGECTOMY         Time Tracking:     PT Received On: 25  PT Start Time: 1043     PT Stop Time: 1100  PT Total Time (min): 17 min     Billable Minutes: Evaluation 17      2025

## 2025-07-09 NOTE — ASSESSMENT & PLAN NOTE
Patient with Hypoxic Respiratory failure which is Acute.  she is not on home oxygen. Supplemental oxygen was provided and noted- Oxygen Concentration (%):  [0.32-40] 0.32    .   Signs/symptoms of respiratory failure include- tachypnea, increased work of breathing, and wheezing. Contributing diagnoses includes - COPD Labs and images were reviewed. Patient Has recent ABG, which has been reviewed. Will treat underlying causes and adjust management of respiratory failure as follows-     Supplemental oxygen to keep oxygen saturation greater than 90%   Treatment of COPD as below.

## 2025-07-09 NOTE — PLAN OF CARE
Problem: COPD (Chronic Obstructive Pulmonary Disease)  Goal: Optimal Chronic Illness Coping  Outcome: Progressing  Goal: Optimal Level of Functional [place]  Outcome: Progressing  Goal: Absence of Infection Signs and Symptoms  Outcome: Progressing  Goal: Improved Oral Intake  Outcome: Progressing  Goal: Effective Oxygenation and Ventilation  Outcome: Progressing     Problem: Adult Inpatient Plan of Care  Goal: Plan of Care Review  Outcome: Progressing  Goal: Patient-Specific Goal (Individualized)  Outcome: Progressing  Goal: Absence of Hospital-Acquired Illness or Injury  Outcome: Progressing  Goal: Optimal Comfort and Wellbeing  Outcome: Progressing  Goal: Readiness for Transition of Care  Outcome: Progressing     Problem: Infection  Goal: Absence of Infection Signs and Symptoms  Outcome: Progressing

## 2025-07-09 NOTE — NURSING
Pt resting in bed, A&Ox4.  VSS sans BP throughout shift.  MD notified of elevated BP- ordered PRN hydralazine.  Pt medicated per MAR.  Pt noted to have persistent cough- MD notified and ordered robitussin.  Pt medicated for cough and anxiety throughout shift per MAR.

## 2025-07-09 NOTE — NURSING
Chart check complete.  Pt sitting comfortably in bed, eating breakfast.  Call light within reach and all needs met at this time.

## 2025-07-09 NOTE — NURSING
Pt A&O x4, pleasant and calm this shift.  Pt on 3L NC with O2 saturation between %.  Pt ambulated to toilet x2 this shift with assist x1.Pt medicated for anxiety per MAR.  Pt medicated for cough per MAR.  VSS stable throughout shift.  Call light within reach and all needs met/anticipated at this time.

## 2025-07-09 NOTE — PLAN OF CARE
Problem: Physical Therapy  Goal: Physical Therapy Goal  Description: Goals    Patient to transfer supine <> sit with no assistance.  Patient to transfer bed <> chair via stand-pivot with no assistance.  Patient to ambulate with no AD and no assistance > 100 feet.   Outcome: Ongoing

## 2025-07-09 NOTE — PLAN OF CARE
Problem: COPD (Chronic Obstructive Pulmonary Disease)  Goal: Optimal Chronic Illness Coping  Outcome: Progressing  Goal: Optimal Level of Functional [place]  Outcome: Progressing  Goal: Absence of Infection Signs and Symptoms  Outcome: Progressing  Goal: Improved Oral Intake  Outcome: Progressing  Goal: Effective Oxygenation and Ventilation  Outcome: Progressing     Problem: Adult Inpatient Plan of Care  Goal: Plan of Care Review  Outcome: Progressing  Goal: Patient-Specific Goal (Individualized)  Outcome: Progressing  Goal: Absence of Hospital-Acquired Illness or Injury  Outcome: Progressing  Goal: Optimal Comfort and Wellbeing  Outcome: Progressing  Goal: Readiness for Transition of Care  Outcome: Progressing     Problem: Infection  Goal: Absence of Infection Signs and Symptoms  Outcome: Progressing

## 2025-07-09 NOTE — SUBJECTIVE & OBJECTIVE
Interval History:  Patient seen and examined.  No acute events overnight.  Has been weaned down to 3 L nasal cannula.  During my examination she is having severe bronchospasms and coughing fit.  Will add Tessalon Perles and scheduled Mucinex.    Review of Systems   Constitutional:  Positive for activity change.   Respiratory:  Positive for cough, shortness of breath and wheezing.    Neurological:  Positive for headaches.   All other systems reviewed and are negative.    Objective:     Vital Signs (Most Recent):  Temp: 98.8 °F (37.1 °C) (07/09/25 0745)  Pulse: 87 (07/09/25 1000)  Resp: (!) 30 (07/09/25 1000)  BP: (!) 159/77 (07/09/25 0745)  SpO2: (!) 91 % (07/09/25 1000) Vital Signs (24h Range):  Temp:  [95.5 °F (35.3 °C)-98.8 °F (37.1 °C)] 98.8 °F (37.1 °C)  Pulse:  [] 87  Resp:  [19-59] 30  SpO2:  [89 %-100 %] 91 %  BP: (159-182)/(77-91) 159/77     Weight: 81 kg (178 lb 9.2 oz)  Body mass index is 31.63 kg/m².    Intake/Output Summary (Last 24 hours) at 7/9/2025 1144  Last data filed at 7/9/2025 0943  Gross per 24 hour   Intake 480 ml   Output 2050 ml   Net -1570 ml         Physical Exam  Constitutional:       General: She is not in acute distress.     Appearance: She is ill-appearing. She is not toxic-appearing or diaphoretic.   HENT:      Head: Normocephalic and atraumatic.   Eyes:      General: No scleral icterus.     Extraocular Movements: Extraocular movements intact.      Conjunctiva/sclera: Conjunctivae normal.   Cardiovascular:      Rate and Rhythm: Regular rhythm. Tachycardia present.      Heart sounds: Normal heart sounds.   Pulmonary:      Breath sounds: Wheezing and rhonchi present.   Abdominal:      General: There is no distension.      Palpations: Abdomen is soft.   Musculoskeletal:         General: Normal range of motion.      Right lower leg: No edema.      Left lower leg: No edema.   Skin:     General: Skin is warm and dry.      Coloration: Skin is not jaundiced or pale.   Neurological:       Mental Status: She is alert and oriented to person, place, and time.   Psychiatric:         Mood and Affect: Mood normal.         Behavior: Behavior normal.               Significant Labs: All pertinent labs within the past 24 hours have been reviewed.  CBC:   Recent Labs   Lab 07/08/25 0552 07/09/25  0615   WBC 11.26 18.23*   HGB 14.4 14.9   HCT 43.3 47.0    299     CMP:   Recent Labs   Lab 07/08/25  0552 07/09/25  0615    137   K 3.9 4.6    107   CO2 20* 17*    140*   BUN 7* 10   CREATININE 1.1 1.0   CALCIUM 9.5 9.8   PROT 7.8  --    ALBUMIN 4.0  --    BILITOT 0.4  --    ALKPHOS 94  --    AST 27  --    ALT 21  --    ANIONGAP 11 13       Significant Imaging: I have reviewed all pertinent imaging results/findings within the past 24 hours.

## 2025-07-09 NOTE — PROGRESS NOTES
[facility] Medicine  Progress Note    Patient Name: Hilda Cheek  MRN: [id number]  Patient Class: OP- Observation   Admission Date: 7/8/2025  Length of Stay: 0 days  Attending Physician: Khoi Quintero MD  Primary Care Provider: Macy Omalley NP        Subjective     Principal Problem:Acute hypoxic respiratory failure        HPI:  Hilda Cheek is a 69 yo female with PMHx significant for HTN, hypothyroidism, and COPD not on home O2.  She presented to the ED with a 4 day onset of worsening shortness of breath associated with productive cough and wheezing.  She reports using her albuterol inhaler, but it was not helping much.  She denies fever, chest pain, abdominal pain, nausea, vomiting, dysuria, or any other symptoms at this time.  In the ED she was noted to be hypoxic and was placed on supplemental oxygen eventually needing BiPAP for short period of time.  She was given IV Solu-Medrol, magnesium, and bronchodilators.  She was able to wean down to 5 L nasal cannula.  Patient will be admitted to the services of Hospital Medicine for further evaluation and treatment.    Overview/Hospital Course:  No notes on file    Interval History:  Patient seen and examined.  No acute events overnight.  Has been weaned down to 3 L nasal cannula.  During my examination she is having severe bronchospasms and coughing fit.  Will add Tessalon Perles and scheduled Mucinex.    Review of Systems   Constitutional:  Positive for activity change.   Respiratory:  Positive for cough, shortness of breath and wheezing.    Neurological:  Positive for headaches.   All other systems reviewed and are negative.    Objective:     Vital Signs (Most Recent):  Temp: 98.8 °F (37.1 °C) (07/09/25 0745)  Pulse: 87 (07/09/25 1000)  Resp: (!) 30 (07/09/25 1000)  BP: (!) 159/77 (07/09/25 0745)  SpO2: (!) 91 % (07/09/25 1000) Vital Signs (24h Range):  Temp:  [95.5 °F (35.3 °C)-98.8 °F (37.1 °C)] 98.8 °F (37.1  °C)  Pulse:  [] 87  Resp:  [19-59] 30  SpO2:  [89 %-100 %] 91 %  BP: (159-182)/(77-91) 159/77     Weight: 81 kg (178 lb 9.2 oz)  Body mass index is 31.63 kg/m².    Intake/Output Summary (Last 24 hours) at 7/9/2025 1144  Last data filed at 7/9/2025 0943  Gross per 24 hour   Intake 480 ml   Output 2050 ml   Net -1570 ml         Physical Exam  Constitutional:       General: She is not in acute distress.     Appearance: She is ill-appearing. She is not toxic-appearing or diaphoretic.   HENT:      Head: Normocephalic and atraumatic.   Eyes:      General: No scleral icterus.     Extraocular Movements: Extraocular movements intact.      Conjunctiva/sclera: Conjunctivae normal.   Cardiovascular:      Rate and Rhythm: Regular rhythm. Tachycardia present.      Heart sounds: Normal heart sounds.   Pulmonary:      Breath sounds: Wheezing and rhonchi present.   Abdominal:      General: There is no distension.      Palpations: Abdomen is soft.   Musculoskeletal:         General: Normal range of motion.      Right lower leg: No edema.      Left lower leg: No edema.   Skin:     General: Skin is warm and dry.      Coloration: Skin is not jaundiced or pale.   Neurological:      Mental Status: She is alert and oriented to person, place, and time.   Psychiatric:         Mood and Affect: Mood normal.         Behavior: Behavior normal.               Significant Labs: All pertinent labs within the past 24 hours have been reviewed.  CBC:   Recent Labs   Lab 07/08/25  0552 07/09/25  0615   WBC 11.26 18.23*   HGB 14.4 14.9   HCT 43.3 47.0    299     CMP:   Recent Labs   Lab 07/08/25  0552 07/09/25  0615    137   K 3.9 4.6    107   CO2 20* 17*    140*   BUN 7* 10   CREATININE 1.1 1.0   CALCIUM 9.5 9.8   PROT 7.8  --    ALBUMIN 4.0  --    BILITOT 0.4  --    ALKPHOS 94  --    AST 27  --    ALT 21  --    ANIONGAP 11 13       Significant Imaging: I have reviewed all pertinent imaging results/findings within the  past 24 hours.      Assessment & Plan  Acute hypoxic respiratory failure  Patient with Hypoxic Respiratory failure which is Acute.  she is not on home oxygen. Supplemental oxygen was provided and noted- Oxygen Concentration (%):  [0.32-40] 0.32    .   Signs/symptoms of respiratory failure include- tachypnea, increased work of breathing, and wheezing. Contributing diagnoses includes - COPD Labs and images were reviewed. Patient Has recent ABG, which has been reviewed. Will treat underlying causes and adjust management of respiratory failure as follows-     Supplemental oxygen to keep oxygen saturation greater than 90%   Treatment of COPD as below.  COPD exacerbation  Patient's COPD is with exacerbation noted by continued dyspnea and worsening of baseline hypoxia currently.  Patient is currently on COPD Pathway. Continue scheduled inhalers Steroids, Antibiotics, and Supplemental oxygen and monitor respiratory status closely.   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  Continue Arimidex.  Patient to supply.    HTN (hypertension)  Patient's blood pressure range in the last 24 hours was: BP  Min: 159/77  Max: 182/85.The patient's inpatient anti-hypertensive regimen is listed below:  Current Antihypertensives  metoprolol tartrate (LOPRESSOR) tablet 25 mg, 2 times daily, Oral  hydrALAZINE tablet 25 mg, Every 8 hours PRN, Oral  lisinopriL tablet 10 mg, Daily, Oral    Plan  - BP is controlled, no changes needed to their regimen    Hypothyroidism  Patient has chronic hypothyroidism. Will continue chronic levothyroxine and adjust for and clinical changes.      VTE Risk Mitigation (From admission, onward)           Ordered     enoxaparin injection 40 mg  Daily         07/08/25 1215     IP VTE HIGH RISK PATIENT  Once         07/08/25 1215                    Discharge Planning   ELSIE: 7/11/2025     Code Status: Full Code   Medical Readiness for Discharge Date:   Discharge Plan A: Home with family                   Please place Justification for DME      Gabrielle Do NP  Department of [facility] Medicine   [place] [facility]

## 2025-07-10 VITALS
HEIGHT: 63 IN | DIASTOLIC BLOOD PRESSURE: 78 MMHG | BODY MASS INDEX: 31.64 KG/M2 | TEMPERATURE: 98 F | OXYGEN SATURATION: 92 % | SYSTOLIC BLOOD PRESSURE: 148 MMHG | HEART RATE: 82 BPM | RESPIRATION RATE: 30 BRPM | WEIGHT: 178.56 LBS

## 2025-07-10 LAB
ABSOLUTE EOSINOPHIL (OHS): 0 K/UL
ABSOLUTE MONOCYTE (OHS): 0.8 K/UL (ref 0.3–1)
ABSOLUTE NEUTROPHIL COUNT (OHS): 12.38 K/UL (ref 1.8–7.7)
ANION GAP (OHS): 10 MMOL/L (ref 8–16)
BASOPHILS # BLD AUTO: 0.02 K/UL
BASOPHILS NFR BLD AUTO: 0.1 %
BILIRUB UR QL STRIP.AUTO: NEGATIVE
BUN SERPL-MCNC: 16 MG/DL (ref 8–23)
CALCIUM SERPL-MCNC: 10 MG/DL (ref 8.7–10.5)
CHLORIDE SERPL-SCNC: 105 MMOL/L (ref 95–110)
CLARITY UR: CLEAR
CO2 SERPL-SCNC: 21 MMOL/L (ref 23–29)
COLOR UR AUTO: YELLOW
CREAT SERPL-MCNC: 1 MG/DL (ref 0.5–1.4)
ERYTHROCYTE [DISTWIDTH] IN BLOOD BY AUTOMATED COUNT: 13.2 % (ref 11.5–14.5)
GFR SERPLBLD CREATININE-BSD FMLA CKD-EPI: >60 ML/MIN/1.73/M2
GLUCOSE SERPL-MCNC: 119 MG/DL (ref 70–110)
GLUCOSE UR QL STRIP: NEGATIVE
HCT VFR BLD AUTO: 42.5 % (ref 37–48.5)
HGB BLD-MCNC: 13.7 GM/DL (ref 12–16)
HGB UR QL STRIP: NEGATIVE
IMM GRANULOCYTES # BLD AUTO: 0.1 K/UL (ref 0–0.04)
IMM GRANULOCYTES NFR BLD AUTO: 0.6 % (ref 0–0.5)
KETONES UR QL STRIP: NEGATIVE
LEUKOCYTE ESTERASE UR QL STRIP: NEGATIVE
LYMPHOCYTES # BLD AUTO: 2.8 K/UL (ref 1–4.8)
MAGNESIUM SERPL-MCNC: 2.1 MG/DL (ref 1.6–2.6)
MCH RBC QN AUTO: 29.2 PG (ref 27–31)
MCHC RBC AUTO-ENTMCNC: 32.2 G/DL (ref 32–36)
MCV RBC AUTO: 91 FL (ref 82–98)
NITRITE UR QL STRIP: NEGATIVE
NUCLEATED RBC (/100WBC) (OHS): 0 /100 WBC
PH UR STRIP: 7 [PH]
PHOSPHATE SERPL-MCNC: 2.7 MG/DL (ref 2.7–4.5)
PLATELET # BLD AUTO: 302 K/UL (ref 150–450)
PMV BLD AUTO: 10.2 FL (ref 9.2–12.9)
POTASSIUM SERPL-SCNC: 4.5 MMOL/L (ref 3.5–5.1)
PROT UR QL STRIP: NEGATIVE
RBC # BLD AUTO: 4.69 M/UL (ref 4–5.4)
RELATIVE EOSINOPHIL (OHS): 0 %
RELATIVE LYMPHOCYTE (OHS): 17.4 % (ref 18–48)
RELATIVE MONOCYTE (OHS): 5 % (ref 4–15)
RELATIVE NEUTROPHIL (OHS): 76.9 % (ref 38–73)
SODIUM SERPL-SCNC: 136 MMOL/L (ref 136–145)
SP GR UR STRIP: 1.01
UROBILINOGEN UR STRIP-ACNC: NEGATIVE EU/DL
WBC # BLD AUTO: 16.1 K/UL (ref 3.9–12.7)

## 2025-07-10 PROCEDURE — 81003 URINALYSIS AUTO W/O SCOPE: CPT | Performed by: NURSE PRACTITIONER

## 2025-07-10 PROCEDURE — 97116 GAIT TRAINING THERAPY: CPT

## 2025-07-10 PROCEDURE — 25000003 PHARM REV CODE 250: Performed by: NURSE PRACTITIONER

## 2025-07-10 PROCEDURE — 94640 AIRWAY INHALATION TREATMENT: CPT

## 2025-07-10 PROCEDURE — 84100 ASSAY OF PHOSPHORUS: CPT | Performed by: NURSE PRACTITIONER

## 2025-07-10 PROCEDURE — 99900035 HC TECH TIME PER 15 MIN (STAT)

## 2025-07-10 PROCEDURE — 63600175 PHARM REV CODE 636 W HCPCS: Performed by: NURSE PRACTITIONER

## 2025-07-10 PROCEDURE — 99239 HOSP IP/OBS DSCHRG MGMT >30: CPT | Mod: ,,, | Performed by: NURSE PRACTITIONER

## 2025-07-10 PROCEDURE — 97530 THERAPEUTIC ACTIVITIES: CPT

## 2025-07-10 PROCEDURE — 85025 COMPLETE CBC W/AUTO DIFF WBC: CPT | Performed by: NURSE PRACTITIONER

## 2025-07-10 PROCEDURE — 83735 ASSAY OF MAGNESIUM: CPT | Performed by: NURSE PRACTITIONER

## 2025-07-10 PROCEDURE — 25000242 PHARM REV CODE 250 ALT 637 W/ HCPCS: Performed by: NURSE PRACTITIONER

## 2025-07-10 PROCEDURE — 27000221 HC OXYGEN, UP TO 24 HOURS

## 2025-07-10 PROCEDURE — 25000003 PHARM REV CODE 250: Performed by: INTERNAL MEDICINE

## 2025-07-10 PROCEDURE — 94761 N-INVAS EAR/PLS OXIMETRY MLT: CPT

## 2025-07-10 PROCEDURE — 36415 COLL VENOUS BLD VENIPUNCTURE: CPT | Performed by: NURSE PRACTITIONER

## 2025-07-10 PROCEDURE — 82435 ASSAY OF BLOOD CHLORIDE: CPT | Performed by: NURSE PRACTITIONER

## 2025-07-10 PROCEDURE — 1111F DSCHRG MED/CURRENT MED MERGE: CPT | Mod: CPTII,,, | Performed by: NURSE PRACTITIONER

## 2025-07-10 PROCEDURE — 94618 PULMONARY STRESS TESTING: CPT

## 2025-07-10 RX ORDER — LISINOPRIL 10 MG/1
10 TABLET ORAL DAILY
Qty: 30 TABLET | Refills: 0 | Status: SHIPPED | OUTPATIENT
Start: 2025-07-11 | End: 2026-07-11

## 2025-07-10 RX ORDER — PREDNISONE 20 MG/1
40 TABLET ORAL DAILY
Qty: 6 TABLET | Refills: 0 | Status: SHIPPED | OUTPATIENT
Start: 2025-07-11 | End: 2025-07-14

## 2025-07-10 RX ORDER — GUAIFENESIN 600 MG/1
1200 TABLET, EXTENDED RELEASE ORAL 2 TIMES DAILY
Qty: 40 TABLET | Refills: 0 | Status: SHIPPED | OUTPATIENT
Start: 2025-07-10 | End: 2025-07-20

## 2025-07-10 RX ORDER — DOXYCYCLINE HYCLATE 100 MG
100 TABLET ORAL EVERY 12 HOURS
Qty: 6 TABLET | Refills: 0 | Status: SHIPPED | OUTPATIENT
Start: 2025-07-10 | End: 2025-07-13

## 2025-07-10 RX ORDER — IPRATROPIUM BROMIDE AND ALBUTEROL SULFATE 2.5; .5 MG/3ML; MG/3ML
3 SOLUTION RESPIRATORY (INHALATION) EVERY 4 HOURS
Qty: 75 ML | Refills: 0 | Status: SHIPPED | OUTPATIENT
Start: 2025-07-10 | End: 2026-07-10

## 2025-07-10 RX ORDER — BENZONATATE 200 MG/1
200 CAPSULE ORAL 3 TIMES DAILY PRN
Qty: 30 CAPSULE | Refills: 0 | Status: SHIPPED | OUTPATIENT
Start: 2025-07-10 | End: 2025-07-20

## 2025-07-10 RX ADMIN — BENZONATATE 200 MG: 100 CAPSULE ORAL at 03:07

## 2025-07-10 RX ADMIN — GUAIFENESIN 1200 MG: 600 TABLET, EXTENDED RELEASE ORAL at 08:07

## 2025-07-10 RX ADMIN — ANASTROZOLE TABLETS 1 MG: 1 TABLET ORAL at 09:07

## 2025-07-10 RX ADMIN — FLUTICASONE FUROATE AND VILANTEROL TRIFENATATE 1 PUFF: 100; 25 POWDER RESPIRATORY (INHALATION) at 07:07

## 2025-07-10 RX ADMIN — IPRATROPIUM BROMIDE AND ALBUTEROL SULFATE 3 ML: .5; 3 SOLUTION RESPIRATORY (INHALATION) at 07:07

## 2025-07-10 RX ADMIN — PANTOPRAZOLE SODIUM 40 MG: 40 TABLET, DELAYED RELEASE ORAL at 08:07

## 2025-07-10 RX ADMIN — PREDNISONE 40 MG: 20 TABLET ORAL at 08:07

## 2025-07-10 RX ADMIN — DOXYCYCLINE HYCLATE 100 MG: 100 TABLET, COATED ORAL at 08:07

## 2025-07-10 RX ADMIN — DICYCLOMINE HYDROCHLORIDE 20 MG: 10 CAPSULE ORAL at 09:07

## 2025-07-10 RX ADMIN — LISINOPRIL 10 MG: 10 TABLET ORAL at 08:07

## 2025-07-10 RX ADMIN — RISPERIDONE 1.5 MG: 0.25 TABLET, FILM COATED ORAL at 09:07

## 2025-07-10 RX ADMIN — DORZOLAMIDE HYDROCHLORIDE 1 DROP: 20 SOLUTION/ DROPS OPHTHALMIC at 09:07

## 2025-07-10 RX ADMIN — METOPROLOL TARTRATE 25 MG: 25 TABLET, FILM COATED ORAL at 08:07

## 2025-07-10 RX ADMIN — LEVOTHYROXINE SODIUM 75 MCG: 0.03 TABLET ORAL at 03:07

## 2025-07-10 NOTE — PLAN OF CARE
Problem: COPD (Chronic Obstructive Pulmonary Disease)  Goal: Optimal Chronic Illness Coping  7/10/2025 1254 by Dayana Brian RN  Outcome: Met  7/10/2025 1113 by Dayana Brian RN  Outcome: Progressing  Goal: Optimal Level of Functional Ogle  7/10/2025 1254 by Dayana Brian RN  Outcome: Met  7/10/2025 1113 by Dayana Brian RN  Outcome: Progressing  Goal: Absence of Infection Signs and Symptoms  7/10/2025 1254 by Dayana Brian RN  Outcome: Met  7/10/2025 1113 by Dayana Brian RN  Outcome: Progressing  Goal: Improved Oral Intake  7/10/2025 1254 by Dayana Brian RN  Outcome: Met  7/10/2025 1113 by Dayana Brian RN  Outcome: Progressing  Goal: Effective Oxygenation and Ventilation  7/10/2025 1254 by Dayana Brian RN  Outcome: Met  7/10/2025 1113 by Dayana Brian RN  Outcome: Progressing     Problem: Adult Inpatient Plan of Care  Goal: Plan of Care Review  7/10/2025 1254 by Dayana Brian RN  Outcome: Met  7/10/2025 1113 by Dayana Brian RN  Outcome: Progressing  Goal: Patient-Specific Goal (Individualized)  7/10/2025 1254 by Dayana Brian RN  Outcome: Met  7/10/2025 1113 by Dayana Brian RN  Outcome: Progressing  Goal: Absence of Hospital-Acquired Illness or Injury  7/10/2025 1254 by Dayana Brian RN  Outcome: Met  7/10/2025 1113 by Dayana Brian RN  Outcome: Progressing  Goal: Optimal Comfort and Wellbeing  7/10/2025 1254 by Dayana Brian RN  Outcome: Met  7/10/2025 1113 by Dayana Brian RN  Outcome: Progressing  Goal: Readiness for Transition of Care  7/10/2025 1254 by Dayana Brian RN  Outcome: Met  7/10/2025 1113 by Dayana Brian RN  Outcome: Progressing     Problem: Infection  Goal: Absence of Infection Signs and Symptoms  7/10/2025 1254 by Dayana Brian RN  Outcome: Met  7/10/2025 1113 by Dayana Brian, RN  Outcome: Progressing     Problem: Physical Therapy  Goal: Physical Therapy Goal  Description: Goals    Patient to  transfer supine <> sit with no assistance.  Patient to transfer bed <> chair via stand-pivot with no assistance.  Patient to ambulate with no AD and no assistance > 100 feet.   Outcome: Met

## 2025-07-10 NOTE — RESPIRATORY THERAPY
07/10/25 0845   Home Oxygen Qualification   $ Home O2 Qualification Pulmonary Stress Test/6 min walk;Tech time 15 minutes   Room Air SpO2 At Rest 93 %   Room Air SpO2 During Ambulation 94 %   SpO2 Post Ambulation 95 %   Post Ambulation Heart Rate 94 bpm   Home O2 Eval Comments Patient did not require oxygen during walk test.

## 2025-07-10 NOTE — NURSING
Tele monitor removed as well as oxygen sensor.     Informed tele tech.    IV removed as well - intact.     Reviewed AVS.    Answered all questions.    Her  is on way.    She wants him to assist her in getting dressed.     Returned home medications x 2.

## 2025-07-10 NOTE — PT/OT/SLP PROGRESS
Physical Therapy Treatment    Patient Name:  Hidla Cheek   MRN:  [id number]    Recommendations:     Discharge Recommendations: No Therapy Indicated  Discharge Equipment Recommendations: none  Barriers to discharge: Ongoing medical treatment    Assessment:     Hilda Cheek is a 70 y.o. female admitted with a medical diagnosis of Acute hypoxic respiratory failure.  She presents with the following impairments/functional limitations: impaired functional mobility, gait instability, impaired cardiopulmonary response to activity.    Rehab Prognosis: Good; patient would benefit from acute skilled PT services to address these deficits and reach maximum level of function.    Recent Surgery: * No surgery found *      Plan:     During this hospitalization, patient to be seen 5 x/week to address the identified rehab impairments via gait training, therapeutic activities, therapeutic exercises and progress toward the following goals:    Plan of Care Expires:  07/23/25    Subjective     Chief Complaint: The patient reports she is feeling better.  She is hoping to go home today.  Patient/Family Comments/goals: The patient wants to return to independent mobility.  Pain/Comfort:  Pain Rating 1: 0/10      Objective:     Communicated with nurse prior to session.  Patient found HOB elevated with telemetry, blood pressure cuff, pulse ox (continuous), PureWick, peripheral IV upon PT entry to room.     General Precautions: Standard, fall, contact  Orthopedic Precautions: N/A  Braces: N/A  Respiratory Status: Room air     Functional Mobility:  Bed Mobility:     Supine to Sit: independence  Sit to Supine: independence  Transfers:     Sit to Stand:  independence with no AD  Gait: Ambulates with SBA approximately 80 feet    Treatment & Education:  The patient transferred supine to sit to stand independently.  She received gait training with SBA approximately 80 feet.  She was then helped into the bathroom where she managed her brief and  lowering herself to the toilet.  After a few minutes, she stood with supervision and donned a brief with min assist but was able to maintain her balance with SBA.  The patient was seen in coordination with the respiratory therapist and she recorded the followin/10/25 0845    Home Oxygen Qualification   $ Home O2 Qualification Pulmonary Stress Test/6 min walk;Tech time 15 minutes   Room Air SpO2 At Rest 93 %   Room Air SpO2 During Ambulation 94 %   SpO2 Post Ambulation 95 %   Post Ambulation Heart Rate 94 bpm   Home O2 Eval Comments Patient did not require oxygen during walk test.        Patient left HOB elevated with all lines intact, call button in reach, bed alarm on, and nurse notified..    GOALS:   Multidisciplinary Problems       Physical Therapy Goals       Not on file              Multidisciplinary Problems (Resolved)          Problem: Physical Therapy    Goal Priority Disciplines Outcome Interventions   Physical Therapy Goal   (Resolved)     PT, PT/OT Met    Description: Goals    Patient to transfer supine <> sit with no assistance.  Patient to transfer bed <> chair via stand-pivot with no assistance.  Patient to ambulate with no AD and no assistance > 100 feet.                        DME Justifications:  No DME recommended requiring DME justifications    Time Tracking:     PT Received On: 07/10/25  PT Start Time: 826     PT Stop Time: 0850  PT Total Time (min): 24 min     Billable Minutes: Gait Training 15 and Therapeutic Activity 9    Treatment Type: Treatment  PT/PTA: PT     Number of PTA visits since last PT visit: 0     07/10/2025

## 2025-07-10 NOTE — NURSING
at bedside.    Assisted with dressing.    Patient thanks us for her care.     Assisted to wheel chair    Rolled out to family car.    NADN>

## 2025-07-10 NOTE — PLAN OF CARE
[facility]  Discharge Final Note    Primary Care Provider: Macy Omalley NP    Expected Discharge Date: 7/10/2025    Per NP her urine specimen is good & can go home once her ride is here. Patient notified. She has follow up appointment with Macy Omlaley NP on Monday. Provided her with voucher to use at [facility] for a nebulizer machine. She will get the rest of her medications from [facility]. Her spouse will provide transportation home. She is calling him now. OK for DC from CM standpoint. Nurse notified.         Final Discharge Note (most recent)       Final Note - 07/10/25 1218          Final Note    Assessment Type Final Discharge Note     Anticipated Discharge Disposition Home or Self Care     What phone number can be called within the next 1-3 days to see how you are doing after discharge? [phone]     Hospital Resources/Appts/Education Provided Appointments scheduled and added to AVS        Post-Acute Status    Discharge Delays None known at this time                     Important Message from Medicare             Contact Info       Macy Omalley NP   Specialty: Family Medicine    [address]  [place] [address]   Phone: [phone]       Next Steps: Go on 7/14/2025    Instructions: appointment Monday July 14th at 11:00am for hospital follow up

## 2025-07-10 NOTE — ASSESSMENT & PLAN NOTE
Patient with Hypoxic Respiratory failure which is Acute.  she is not on home oxygen. Supplemental oxygen was provided and noted- Oxygen Concentration (%):  [32] 32    .   Signs/symptoms of respiratory failure include- tachypnea, increased work of breathing, and wheezing. Contributing diagnoses includes - COPD Labs and images were reviewed. Patient Has recent ABG, which has been reviewed. Will treat underlying causes and adjust management of respiratory failure as follows-     Supplemental oxygen to keep oxygen saturation greater than 90%   Treatment of COPD as below.

## 2025-07-10 NOTE — PLAN OF CARE
07/10/25 0914   Medicare Message   Important Message from Medicare regarding Discharge Appeal Rights Given to patient/caregiver;Explained to patient/caregiver;Signed/date by patient/caregiver   Date IMM was signed 07/10/25   Time IMM was signed 0914

## 2025-07-10 NOTE — PROGRESS NOTES
07/10/25 1347   Missed Time Reason   OT Attempted Eval Date 07/10/25   OT Attempted Eval Time 1347   Missed Treatment Reason Patient discharged prior to initiation of evaluation

## 2025-07-10 NOTE — ASSESSMENT & PLAN NOTE
Patient's blood pressure range in the last 24 hours was: BP  Min: 107/67  Max: 165/118.The patient's inpatient anti-hypertensive regimen is listed below:  Current Antihypertensives  metoprolol tartrate (LOPRESSOR) tablet 25 mg, 2 times daily, Oral  hydrALAZINE tablet 25 mg, Every 8 hours PRN, Oral  lisinopriL tablet 10 mg, Daily, Oral  lisinopril (PRINIVIL,ZESTRIL) tablet, Daily, Oral    Plan  - BP is controlled, no changes needed to their regimen

## 2025-07-10 NOTE — DISCHARGE SUMMARY
[facility] Medicine  Discharge Summary      Patient Name: Hilda Cheek  MRN: [id number]  [facility]: [id number]  Patient Class: IP- Inpatient  Admission Date: 7/8/2025  Hospital Length of Stay: 1 days  Discharge Date and Time: 7/10/2025  2:06 PM  Attending Physician: Khoi Quintero MD   Discharging Provider: Gabrielle Do NP  Primary Care Provider: Macy Omalley NP    Primary Care Team: Networked reference to record PCT     HPI:   Hilda Cheek is a 69 yo female with PMHx significant for HTN, hypothyroidism, and COPD not on home O2.  She presented to the ED with a 4 day onset of worsening shortness of breath associated with productive cough and wheezing.  She reports using her albuterol inhaler, but it was not helping much.  She denies fever, chest pain, abdominal pain, nausea, vomiting, dysuria, or any other symptoms at this time.  In the ED she was noted to be hypoxic and was placed on supplemental oxygen eventually needing BiPAP for short period of time.  She was given IV Solu-Medrol, magnesium, and bronchodilators.  She was able to wean down to 5 L nasal cannula.  Patient will be admitted to the services of Hospital Medicine for further evaluation and treatment.    * No surgery found *      Hospital Course:   Patient was admitted with acute hypoxic respiratory failure secondary to COPD exacerbation.  She was provided supplemental oxygen, steroids, antibiotics, and breathing treatments.  He was monitored closely during her stay.  She was slowly able to wean off of oxygen.  Her symptoms improved and she expressed readiness for discharge.  Patient received maximum benefit from her inpatient stay and was cleared medically for discharge.  Her medications were sent to her pharmacy of choice.  Discharge instructions as well as return precautions were discussed with patient with good understanding.     Goals of Care Treatment Preferences:  Code Status: Full Code          Consults:     Assessment & Plan  Acute hypoxic respiratory failure  Patient with Hypoxic Respiratory failure which is Acute.  she is not on home oxygen. Supplemental oxygen was provided and noted- Oxygen Concentration (%):  [32] 32    .   Signs/symptoms of respiratory failure include- tachypnea, increased work of breathing, and wheezing. Contributing diagnoses includes - COPD Labs and images were reviewed. Patient Has recent ABG, which has been reviewed. Will treat underlying causes and adjust management of respiratory failure as follows-     Supplemental oxygen to keep oxygen saturation greater than 90%   Treatment of COPD as below.  COPD exacerbation  Patient's COPD is with exacerbation noted by continued dyspnea and worsening of baseline hypoxia currently.  Patient is currently on COPD Pathway. Continue scheduled inhalers Steroids, Antibiotics, and Supplemental oxygen and monitor respiratory status closely.   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  Continue Arimidex.  Patient to supply.    HTN (hypertension)  Patient's blood pressure range in the last 24 hours was: BP  Min: 107/67  Max: 165/118.The patient's inpatient anti-hypertensive regimen is listed below:  Current Antihypertensives  metoprolol tartrate (LOPRESSOR) tablet 25 mg, 2 times daily, Oral  hydrALAZINE tablet 25 mg, Every 8 hours PRN, Oral  lisinopriL tablet 10 mg, Daily, Oral  lisinopril (PRINIVIL,ZESTRIL) tablet, Daily, Oral    Plan  - BP is controlled, no changes needed to their regimen    Hypothyroidism  Patient has chronic hypothyroidism. Will continue chronic levothyroxine and adjust for and clinical changes.      Final Active Diagnoses:    Diagnosis Date Noted POA    PRINCIPAL PROBLEM:  Acute hypoxic respiratory failure [J96.01] 07/08/2025 Yes    COPD exacerbation [J44.1] 07/08/2025 Yes    HTN (hypertension) [I10] 07/08/2025 Yes    Hypothyroidism [E03.9] 07/08/2025 Yes    Malignant neoplasm of lower-inner  quadrant of female breast [C50.319] 02/17/2024 Yes      Problems Resolved During this Admission:       Discharged Condition: good    Disposition: Home or Self Care    Follow Up:   Follow-up Information       Shahram, Macy M., NP. Go on 7/14/2025.    Specialty: Family Medicine  Why: appointment Monday July 14th at 11:00am for hospital follow up  Contact information:  [address] [place] [address] [address]  [place] [address] [phone]                           Patient Instructions:      Diet Cardiac     Notify your health care provider if you experience any of the following:  temperature >100.4     Notify your health care provider if you experience any of the following:  persistent nausea and vomiting or diarrhea     Notify your health care provider if you experience any of the following:  severe uncontrolled pain     Notify your health care provider if you experience any of the following:  difficulty breathing or increased cough     Notify your health care provider if you experience any of the following:  severe persistent headache     Notify your health care provider if you experience any of the following:  persistent dizziness, light-headedness, or visual disturbances     Notify your health care provider if you experience any of the following:  increased confusion or weakness     Activity as tolerated       Significant Diagnostic Studies: Labs: CMP   Recent Labs   Lab 07/09/25  0615 07/10/25  0741    136   K 4.6 4.5    105   CO2 17* 21*   * 119*   BUN 10 16   CREATININE 1.0 1.0   CALCIUM 9.8 10.0   ANIONGAP 13 10    and CBC   Recent Labs   Lab 07/09/25  0615 07/10/25  0741   WBC 18.23* 16.10*   HGB 14.9 13.7   HCT 47.0 42.5    302     Microbiology:   Microbiology Results (last 7 days)       Procedure Component Value Units Date/Time    Culture, Respiratory with Gram Stain [[id number]] Collected: 07/08/25 1440    Order Status: Completed Specimen: Respiratory from Sputum Updated: 07/10/25 0982      Respiratory Culture Normal respiratory deonna     GRAM STAIN <10 Epithelial Cells/LPF      Rare WBC seen      Few Gram positive cocci            Radiology: X-Ray: CXR: X-Ray Chest 1 View (CXR):   Results for orders placed or performed during the hospital encounter of 07/08/25   X-Ray Chest 1 View    Narrative    EXAMINATION:  XR CHEST 1 VIEW    CLINICAL HISTORY:  shortness of breath;    TECHNIQUE:  Single frontal view of the chest was performed.    COMPARISON:  02/26/2025.    FINDINGS:  The lungs are well expanded and clear. No focal opacities are seen. The pleural spaces are clear. The cardiac silhouette is unremarkable. The visualized osseous structures are unremarkable.      Impression    No acute abnormality.      Electronically signed by: Sanjay Garcia  Date:    07/08/2025  Time:    05:52       Pending Diagnostic Studies:       Procedure Component Value Units Date/Time    GREY TOP URINE HOLD [[id number]] Collected: 07/10/25 1121    Order Status: Sent Lab Status: In process Updated: 07/10/25 1130    Specimen: Urine            Medications:  Reconciled Home Medications:      Medication List        START taking these medications      albuterol-ipratropium 2.5 mg-0.5 mg/3 mL nebulizer solution  Commonly known as: DUO-NEB  Take 3 mLs by nebulization every 4 (four) hours. Rescue     benzonatate 200 MG capsule  Commonly known as: TESSALON  Take 1 capsule (200 mg total) by mouth 3 (three) times daily as needed for Cough.     doxycycline 100 MG tablet  Commonly known as: VIBRA-TABS  Take 1 tablet (100 mg total) by mouth every 12 (twelve) hours. for 3 days     guaiFENesin 600 mg 12 hr tablet  Commonly known as: MUCINEX  Take 2 tablets (1,200 mg total) by mouth 2 (two) times daily. for 10 days     lisinopriL 10 MG tablet  Take 1 tablet (10 mg total) by mouth once daily.  Start taking on: July 11, 2025            CHANGE how you take these medications      * predniSONE 20 MG tablet  Commonly known as: DELTASONE  Take by  mouth.  What changed: Another medication with the same name was added. Make sure you understand how and when to take each.     * predniSONE 20 MG tablet  Commonly known as: DELTASONE  Take 2 tablets (40 mg total) by mouth once daily. for 3 days  Start taking on: July 11, 2025  What changed: You were already taking a medication with the same name, and this prescription was added. Make sure you understand how and when to take each.           * This list has 2 medication(s) that are the same as other medications prescribed for you. Read the directions carefully, and ask your doctor or other care provider to review them with you.                CONTINUE taking these medications      albuterol 90 mcg/actuation inhaler  Commonly known as: PROVENTIL/VENTOLIN HFA  inhale 2 puff by inhalation route  every 4 - 6 hours as needed as needed     anastrozole 1 mg Tab  Commonly known as: ARIMIDEX  Take 1 mg by mouth as needed.     calcium carbonate 500 mg calcium (1,250 mg) tablet  Commonly known as: OS-KAYE     cariprazine 1.5 mg Cap  Commonly known as: VRAYLAR  Take 1 capsule by mouth.     diazePAM 10 MG Tab  Commonly known as: VALIUM  Take by mouth.     diclofenac sodium 1 % Gel  Commonly known as: VOLTAREN  Apply topically.     dicyclomine 20 mg tablet  Commonly known as: BENTYL  dicyclomine 20 mg tablet     dorzolamide 2 % ophthalmic solution  Commonly known as: TRUSOPT  INSTILL 1 DROPS INTO EACH EYE TWICE DAILY     DULoxetine 60 MG capsule  Commonly known as: CYMBALTA  Take 60 mg by mouth every morning.     fluconazole 150 MG Tab  Commonly known as: DIFLUCAN  Take one tablet once, take additional tablets as directed by provider.     fluticasone-salmeterol 250-50 mcg/dose 250-50 mcg/dose diskus inhaler  Commonly known as: ADVAIR  Inhale 1 puff into the lungs.     HYDROcodone-acetaminophen  mg per tablet  Commonly known as: NORCO  60 EA, 0 Refill(s), TAKE 1 TABLET BY MOUTH TWICE DAILY AS NEEDED FOR PAIN, 0 Refill(s)      hydrOXYzine HCL 25 MG tablet  Commonly known as: ATARAX  Take 2 tablets (50 mg total) by mouth every 6 (six) hours.     latanoprost 0.005 % ophthalmic solution  INSTILL 1 DROP INTO EACH EYE AT BEDTIME     levothyroxine 75 MCG tablet  Commonly known as: SYNTHROID  Take by mouth.     loratadine 10 mg tablet  Commonly known as: CLARITIN  Take 10 mg by mouth daily as needed.     meloxicam 15 MG tablet  Commonly known as: MOBIC  Take 1 tablet (15 mg total) by mouth once daily. **NO REFILLS**MUST SCHEDULE ANNUAL EXAM**     metoprolol tartrate 25 MG tablet  Commonly known as: LOPRESSOR  Take 25 mg by mouth 2 (two) times daily.     nystatin cream  Commonly known as: MYCOSTATIN  Apply topically 2 (two) times daily.     omeprazole 40 MG capsule  Commonly known as: PRILOSEC  Take 1 capsule by mouth.     ondansetron 4 MG tablet  Commonly known as: ZOFRAN  Take 1 tablet (4 mg total) by mouth every 6 (six) hours as needed for Nausea.     promethazine-dextromethorphan 6.25-15 mg/5 mL Syrp  Commonly known as: PROMETHAZINE-DM  Take 5 mLs by mouth every 6 (six) hours as needed.              Indwelling Lines/Drains at time of discharge:   Lines/Drains/Airways       Drain  Duration             Female External Urinary Catheter w/ Suction 07/08/25 1203 2 days                        Time spent on the discharge of patient: 35 minutes         Gabrielle Do NP  Department of Hospital Medicine  [place] [facility]

## 2025-07-10 NOTE — PLAN OF CARE
Problem: COPD (Chronic Obstructive Pulmonary Disease)  Goal: Optimal Chronic Illness Coping  Outcome: Progressing  Goal: Optimal Level of Functional [place]  Outcome: Progressing  Goal: Absence of Infection Signs and Symptoms  Outcome: Progressing  Goal: Improved Oral Intake  Outcome: Progressing  Goal: Effective Oxygenation and Ventilation  Outcome: Progressing     Problem: Adult Inpatient Plan of Care  Goal: Plan of Care Review  Outcome: Progressing  Goal: Patient-Specific Goal (Individualized)  Outcome: Progressing  Goal: Absence of Hospital-Acquired Illness or Injury  Outcome: Progressing  Goal: Optimal Comfort and Wellbeing  Outcome: Progressing  Goal: Readiness for Transition of Care  Outcome: Progressing     Problem: Infection  Goal: Absence of Infection Signs and Symptoms  Outcome: Progressing

## 2025-07-10 NOTE — PLAN OF CARE
Problem: COPD (Chronic Obstructive Pulmonary Disease)  Goal: Optimal Chronic Illness Coping  Outcome: Progressing  Goal: Optimal Level of Functional [place]  Outcome: Progressing  Goal: Absence of Infection Signs and Symptoms  Outcome: Progressing  Goal: Improved Oral Intake  Outcome: Progressing  Goal: Effective Oxygenation and Ventilation  Outcome: Progressing     Problem: Adult Inpatient Plan of Care  Goal: Plan of Care Review  Outcome: Progressing  Goal: Patient-Specific Goal (Individualized)  Outcome: Progressing  Goal: Absence of Hospital-Acquired Illness or Injury  Outcome: Progressing  Goal: Optimal Comfort and Wellbeing  Outcome: Progressing  Goal: Readiness for Transition of Care  Outcome: Progressing     Problem: Infection  Goal: Absence of Infection Signs and Symptoms  Outcome: Progressing

## 2025-07-11 LAB
BACTERIA SPT CULT: ABNORMAL
BACTERIA SPT CULT: ABNORMAL
GRAM STN SPEC: ABNORMAL